# Patient Record
Sex: MALE | Race: WHITE | NOT HISPANIC OR LATINO | URBAN - METROPOLITAN AREA
[De-identification: names, ages, dates, MRNs, and addresses within clinical notes are randomized per-mention and may not be internally consistent; named-entity substitution may affect disease eponyms.]

---

## 2020-02-20 ENCOUNTER — INPATIENT (INPATIENT)
Facility: HOSPITAL | Age: 75
LOS: 4 days | Discharge: HOME HEALTH | End: 2020-02-25
Attending: INTERNAL MEDICINE | Admitting: INTERNAL MEDICINE
Payer: MEDICARE

## 2020-02-20 VITALS
HEART RATE: 129 BPM | WEIGHT: 145.06 LBS | HEIGHT: 66 IN | SYSTOLIC BLOOD PRESSURE: 207 MMHG | OXYGEN SATURATION: 97 % | TEMPERATURE: 98 F | RESPIRATION RATE: 18 BRPM | DIASTOLIC BLOOD PRESSURE: 119 MMHG

## 2020-02-20 DIAGNOSIS — N20.0 CALCULUS OF KIDNEY: ICD-10-CM

## 2020-02-20 DIAGNOSIS — R33.9 RETENTION OF URINE, UNSPECIFIED: ICD-10-CM

## 2020-02-20 DIAGNOSIS — N43.3 HYDROCELE, UNSPECIFIED: Chronic | ICD-10-CM

## 2020-02-20 DIAGNOSIS — I10 ESSENTIAL (PRIMARY) HYPERTENSION: ICD-10-CM

## 2020-02-20 DIAGNOSIS — K40.90 UNILATERAL INGUINAL HERNIA, WITHOUT OBSTRUCTION OR GANGRENE, NOT SPECIFIED AS RECURRENT: ICD-10-CM

## 2020-02-20 DIAGNOSIS — N17.9 ACUTE KIDNEY FAILURE, UNSPECIFIED: ICD-10-CM

## 2020-02-20 DIAGNOSIS — G62.9 POLYNEUROPATHY, UNSPECIFIED: ICD-10-CM

## 2020-02-20 LAB
ALBUMIN SERPL ELPH-MCNC: 4 G/DL — SIGNIFICANT CHANGE UP (ref 3.5–5.2)
ALP SERPL-CCNC: 97 U/L — SIGNIFICANT CHANGE UP (ref 30–115)
ALT FLD-CCNC: 22 U/L — SIGNIFICANT CHANGE UP (ref 0–41)
ANION GAP SERPL CALC-SCNC: 17 MMOL/L — HIGH (ref 7–14)
APPEARANCE UR: CLEAR — SIGNIFICANT CHANGE UP
AST SERPL-CCNC: 42 U/L — HIGH (ref 0–41)
BACTERIA # UR AUTO: ABNORMAL
BASE EXCESS BLDV CALC-SCNC: -3.1 MMOL/L — LOW (ref -2–2)
BASOPHILS # BLD AUTO: 0.03 K/UL — SIGNIFICANT CHANGE UP (ref 0–0.2)
BASOPHILS NFR BLD AUTO: 0.3 % — SIGNIFICANT CHANGE UP (ref 0–1)
BILIRUB SERPL-MCNC: 1.2 MG/DL — SIGNIFICANT CHANGE UP (ref 0.2–1.2)
BILIRUB UR-MCNC: NEGATIVE — SIGNIFICANT CHANGE UP
BUN SERPL-MCNC: 64 MG/DL — CRITICAL HIGH (ref 10–20)
CA-I SERPL-SCNC: 1.09 MMOL/L — LOW (ref 1.12–1.3)
CALCIUM SERPL-MCNC: 8.1 MG/DL — LOW (ref 8.5–10.1)
CHLORIDE SERPL-SCNC: 103 MMOL/L — SIGNIFICANT CHANGE UP (ref 98–110)
CO2 SERPL-SCNC: 17 MMOL/L — SIGNIFICANT CHANGE UP (ref 17–32)
COLOR SPEC: YELLOW — SIGNIFICANT CHANGE UP
CREAT SERPL-MCNC: 4.9 MG/DL — CRITICAL HIGH (ref 0.7–1.5)
DIFF PNL FLD: ABNORMAL
EOSINOPHIL # BLD AUTO: 0.01 K/UL — SIGNIFICANT CHANGE UP (ref 0–0.7)
EOSINOPHIL NFR BLD AUTO: 0.1 % — SIGNIFICANT CHANGE UP (ref 0–8)
EPI CELLS # UR: ABNORMAL /HPF
GAS PNL BLDV: 139 MMOL/L — SIGNIFICANT CHANGE UP (ref 136–145)
GAS PNL BLDV: SIGNIFICANT CHANGE UP
GLUCOSE BLDC GLUCOMTR-MCNC: 185 MG/DL — HIGH (ref 70–99)
GLUCOSE BLDC GLUCOMTR-MCNC: 190 MG/DL — HIGH (ref 70–99)
GLUCOSE SERPL-MCNC: 244 MG/DL — HIGH (ref 70–99)
GLUCOSE UR QL: NEGATIVE MG/DL — SIGNIFICANT CHANGE UP
HCO3 BLDV-SCNC: 22 MMOL/L — SIGNIFICANT CHANGE UP (ref 22–29)
HCT VFR BLD CALC: 40.5 % — LOW (ref 42–52)
HCT VFR BLDA CALC: 45.5 % — HIGH (ref 34–44)
HGB BLD CALC-MCNC: 14.8 G/DL — SIGNIFICANT CHANGE UP (ref 14–18)
HGB BLD-MCNC: 14.1 G/DL — SIGNIFICANT CHANGE UP (ref 14–18)
HOROWITZ INDEX BLDV+IHG-RTO: 21 — SIGNIFICANT CHANGE UP
IMM GRANULOCYTES NFR BLD AUTO: 0.3 % — SIGNIFICANT CHANGE UP (ref 0.1–0.3)
KETONES UR-MCNC: NEGATIVE — SIGNIFICANT CHANGE UP
LACTATE BLDV-MCNC: 0.9 MMOL/L — SIGNIFICANT CHANGE UP (ref 0.5–1.6)
LACTATE SERPL-SCNC: 1.2 MMOL/L — SIGNIFICANT CHANGE UP (ref 0.7–2)
LEUKOCYTE ESTERASE UR-ACNC: NEGATIVE — SIGNIFICANT CHANGE UP
LYMPHOCYTES # BLD AUTO: 0.73 K/UL — LOW (ref 1.2–3.4)
LYMPHOCYTES # BLD AUTO: 6.3 % — LOW (ref 20.5–51.1)
MCHC RBC-ENTMCNC: 30.4 PG — SIGNIFICANT CHANGE UP (ref 27–31)
MCHC RBC-ENTMCNC: 34.8 G/DL — SIGNIFICANT CHANGE UP (ref 32–37)
MCV RBC AUTO: 87.3 FL — SIGNIFICANT CHANGE UP (ref 80–94)
MONOCYTES # BLD AUTO: 1.2 K/UL — HIGH (ref 0.1–0.6)
MONOCYTES NFR BLD AUTO: 10.3 % — HIGH (ref 1.7–9.3)
NEUTROPHILS # BLD AUTO: 9.64 K/UL — HIGH (ref 1.4–6.5)
NEUTROPHILS NFR BLD AUTO: 82.7 % — HIGH (ref 42.2–75.2)
NITRITE UR-MCNC: NEGATIVE — SIGNIFICANT CHANGE UP
NRBC # BLD: 0 /100 WBCS — SIGNIFICANT CHANGE UP (ref 0–0)
PCO2 BLDV: 38 MMHG — LOW (ref 41–51)
PH BLDV: 7.37 — SIGNIFICANT CHANGE UP (ref 7.26–7.43)
PH UR: 5.5 — SIGNIFICANT CHANGE UP (ref 5–8)
PLATELET # BLD AUTO: 120 K/UL — LOW (ref 130–400)
PO2 BLDV: 35 MMHG — SIGNIFICANT CHANGE UP (ref 20–40)
POTASSIUM BLDV-SCNC: 4.3 MMOL/L — SIGNIFICANT CHANGE UP (ref 3.3–5.6)
POTASSIUM SERPL-MCNC: 4.6 MMOL/L — SIGNIFICANT CHANGE UP (ref 3.5–5)
POTASSIUM SERPL-SCNC: 4.6 MMOL/L — SIGNIFICANT CHANGE UP (ref 3.5–5)
PROT SERPL-MCNC: 6.5 G/DL — SIGNIFICANT CHANGE UP (ref 6–8)
PROT UR-MCNC: NEGATIVE MG/DL — SIGNIFICANT CHANGE UP
RBC # BLD: 4.64 M/UL — LOW (ref 4.7–6.1)
RBC # FLD: 12.1 % — SIGNIFICANT CHANGE UP (ref 11.5–14.5)
SAO2 % BLDV: 67 % — SIGNIFICANT CHANGE UP
SODIUM SERPL-SCNC: 137 MMOL/L — SIGNIFICANT CHANGE UP (ref 135–146)
SP GR SPEC: 1.01 — SIGNIFICANT CHANGE UP (ref 1.01–1.03)
UROBILINOGEN FLD QL: 0.2 MG/DL — SIGNIFICANT CHANGE UP (ref 0.2–0.2)
WBC # BLD: 11.65 K/UL — HIGH (ref 4.8–10.8)
WBC # FLD AUTO: 11.65 K/UL — HIGH (ref 4.8–10.8)
WBC UR QL: SIGNIFICANT CHANGE UP /HPF

## 2020-02-20 PROCEDURE — 74176 CT ABD & PELVIS W/O CONTRAST: CPT | Mod: 26

## 2020-02-20 PROCEDURE — 71046 X-RAY EXAM CHEST 2 VIEWS: CPT | Mod: 26

## 2020-02-20 PROCEDURE — 99285 EMERGENCY DEPT VISIT HI MDM: CPT | Mod: 25,GC

## 2020-02-20 PROCEDURE — 76770 US EXAM ABDO BACK WALL COMP: CPT | Mod: 26

## 2020-02-20 PROCEDURE — 99223 1ST HOSP IP/OBS HIGH 75: CPT | Mod: AI

## 2020-02-20 PROCEDURE — 51703 INSERT BLADDER CATH COMPLEX: CPT | Mod: GC

## 2020-02-20 PROCEDURE — 99497 ADVNCD CARE PLAN 30 MIN: CPT | Mod: 25

## 2020-02-20 RX ORDER — DEXTROSE 50 % IN WATER 50 %
15 SYRINGE (ML) INTRAVENOUS ONCE
Refills: 0 | Status: DISCONTINUED | OUTPATIENT
Start: 2020-02-20 | End: 2020-02-25

## 2020-02-20 RX ORDER — HYDRALAZINE HCL 50 MG
25 TABLET ORAL EVERY 8 HOURS
Refills: 0 | Status: DISCONTINUED | OUTPATIENT
Start: 2020-02-20 | End: 2020-02-25

## 2020-02-20 RX ORDER — IOHEXOL 300 MG/ML
30 INJECTION, SOLUTION INTRAVENOUS ONCE
Refills: 0 | Status: COMPLETED | OUTPATIENT
Start: 2020-02-20 | End: 2020-02-20

## 2020-02-20 RX ORDER — CHLORHEXIDINE GLUCONATE 213 G/1000ML
1 SOLUTION TOPICAL
Refills: 0 | Status: DISCONTINUED | OUTPATIENT
Start: 2020-02-20 | End: 2020-02-25

## 2020-02-20 RX ORDER — INSULIN LISPRO 100/ML
VIAL (ML) SUBCUTANEOUS
Refills: 0 | Status: DISCONTINUED | OUTPATIENT
Start: 2020-02-20 | End: 2020-02-25

## 2020-02-20 RX ORDER — INFLUENZA VIRUS VACCINE 15; 15; 15; 15 UG/.5ML; UG/.5ML; UG/.5ML; UG/.5ML
0.5 SUSPENSION INTRAMUSCULAR ONCE
Refills: 0 | Status: COMPLETED | OUTPATIENT
Start: 2020-02-20 | End: 2020-02-25

## 2020-02-20 RX ORDER — SODIUM CHLORIDE 9 MG/ML
1000 INJECTION, SOLUTION INTRAVENOUS
Refills: 0 | Status: DISCONTINUED | OUTPATIENT
Start: 2020-02-20 | End: 2020-02-25

## 2020-02-20 RX ORDER — SODIUM CHLORIDE 9 MG/ML
1000 INJECTION INTRAMUSCULAR; INTRAVENOUS; SUBCUTANEOUS
Refills: 0 | Status: DISCONTINUED | OUTPATIENT
Start: 2020-02-20 | End: 2020-02-20

## 2020-02-20 RX ORDER — DEXTROSE 50 % IN WATER 50 %
12.5 SYRINGE (ML) INTRAVENOUS ONCE
Refills: 0 | Status: DISCONTINUED | OUTPATIENT
Start: 2020-02-20 | End: 2020-02-25

## 2020-02-20 RX ORDER — DEXTROSE 50 % IN WATER 50 %
25 SYRINGE (ML) INTRAVENOUS ONCE
Refills: 0 | Status: DISCONTINUED | OUTPATIENT
Start: 2020-02-20 | End: 2020-02-25

## 2020-02-20 RX ORDER — GLUCAGON INJECTION, SOLUTION 0.5 MG/.1ML
1 INJECTION, SOLUTION SUBCUTANEOUS ONCE
Refills: 0 | Status: DISCONTINUED | OUTPATIENT
Start: 2020-02-20 | End: 2020-02-25

## 2020-02-20 RX ORDER — SODIUM CHLORIDE 9 MG/ML
2000 INJECTION, SOLUTION INTRAVENOUS ONCE
Refills: 0 | Status: COMPLETED | OUTPATIENT
Start: 2020-02-20 | End: 2020-02-20

## 2020-02-20 RX ORDER — SODIUM CHLORIDE 9 MG/ML
1000 INJECTION INTRAMUSCULAR; INTRAVENOUS; SUBCUTANEOUS
Refills: 0 | Status: DISCONTINUED | OUTPATIENT
Start: 2020-02-20 | End: 2020-02-25

## 2020-02-20 RX ORDER — LABETALOL HCL 100 MG
100 TABLET ORAL ONCE
Refills: 0 | Status: COMPLETED | OUTPATIENT
Start: 2020-02-20 | End: 2020-02-20

## 2020-02-20 RX ORDER — HEPARIN SODIUM 5000 [USP'U]/ML
5000 INJECTION INTRAVENOUS; SUBCUTANEOUS EVERY 8 HOURS
Refills: 0 | Status: DISCONTINUED | OUTPATIENT
Start: 2020-02-20 | End: 2020-02-25

## 2020-02-20 RX ORDER — ACETAMINOPHEN 500 MG
650 TABLET ORAL EVERY 6 HOURS
Refills: 0 | Status: DISCONTINUED | OUTPATIENT
Start: 2020-02-20 | End: 2020-02-25

## 2020-02-20 RX ORDER — TAMSULOSIN HYDROCHLORIDE 0.4 MG/1
0.4 CAPSULE ORAL AT BEDTIME
Refills: 0 | Status: DISCONTINUED | OUTPATIENT
Start: 2020-02-20 | End: 2020-02-25

## 2020-02-20 RX ADMIN — SODIUM CHLORIDE 75 MILLILITER(S): 9 INJECTION INTRAMUSCULAR; INTRAVENOUS; SUBCUTANEOUS at 16:32

## 2020-02-20 RX ADMIN — SODIUM CHLORIDE 2000 MILLILITER(S): 9 INJECTION, SOLUTION INTRAVENOUS at 08:30

## 2020-02-20 RX ADMIN — Medication 1: at 16:55

## 2020-02-20 RX ADMIN — TAMSULOSIN HYDROCHLORIDE 0.4 MILLIGRAM(S): 0.4 CAPSULE ORAL at 18:26

## 2020-02-20 RX ADMIN — Medication 100 MILLIGRAM(S): at 22:26

## 2020-02-20 RX ADMIN — HEPARIN SODIUM 5000 UNIT(S): 5000 INJECTION INTRAVENOUS; SUBCUTANEOUS at 21:09

## 2020-02-20 RX ADMIN — Medication 25 MILLIGRAM(S): at 15:29

## 2020-02-20 RX ADMIN — HEPARIN SODIUM 5000 UNIT(S): 5000 INJECTION INTRAVENOUS; SUBCUTANEOUS at 18:27

## 2020-02-20 RX ADMIN — IOHEXOL 30 MILLILITER(S): 300 INJECTION, SOLUTION INTRAVENOUS at 08:28

## 2020-02-20 NOTE — GOALS OF CARE CONVERSATION - ADVANCED CARE PLANNING - CONVERSATION DETAILS
patient's overall medical condition is discussed with him in the presence of sister.  He does not like to follow up with physicians, admitted for DAISY, Urinary retention/obstructive uropathy, elevated BP.     He wishes to be DNR/DNI; signed MOLST form

## 2020-02-20 NOTE — H&P ADULT - NSICDXPASTMEDICALHX_GEN_ALL_CORE_FT
PAST MEDICAL HISTORY:  Common ichthyosis     Hydrocele     Inguinal hernia     Peripheral neuropathy

## 2020-02-20 NOTE — ED PROVIDER NOTE - NS ED ROS FT
GEN:  no fever, no chills  NEURO:  no headache, no dizziness  ENT: no sore throat, no runny nose  CV:  no chest pain, no palpitations  RESP:  no sob, no cough  GI:  no nausea, no vomiting, no abdominal pain, no diarrhea  :  no dysuria, + urinary frequency, no hematuria  MSK:  no joint pain, no edema  SKIN:  + chronic rash, no bruising  HEME: no hematochezia, no melena

## 2020-02-20 NOTE — ED PROVIDER NOTE - PMH
Hydrocele Common ichthyosis    Hydrocele    Inguinal hernia Common ichthyosis    Hydrocele    Inguinal hernia    Peripheral neuropathy

## 2020-02-20 NOTE — ED PROVIDER NOTE - ATTENDING CONTRIBUTION TO CARE
74y male c/o progressive inability to empty bladder, has not seen a doctor in at least 10 years, has had elevated BP for which he has not been treated, as well as an inguinal hernia at least 25 years old for which he has not sought care, no change in size, +BMs and flatus with pain secondary to hemorrhoids, +lower abd discomfort, no back pain, no f/c, no cp/sob, no h/a, denies etoh or drugs, on exam vital signs appreciated, nontoxic but uncomfortable, head nc/at, perrla 2mm, conj pink neck supple cor tachy, reg, lungs cta, abd +bs, soft, distended, suprapubic dullness, has larg inguinal hernia without overlying erythema, nonreducible, difficult to appreciated right testis due to hernia but seems nontender, left testis nontender, has nonbleeding external hemorrhoid, neuro nonfocal, will place yoo, repeat VS, check labs and CT

## 2020-02-20 NOTE — ED PROVIDER NOTE - PROGRESS NOTE DETAILS
TC TC: 75 yo M with PMHx of ichthyosis, L hydrocele s/p hydrocelectomy, R inguinal hernia, peripheral neuropathy who presents with urinary retention x 1 wk. Has chronic large R inguinal hernia which is soft. No systemic sx. No CVA tenderness. Ordered labs, ekg, CT abd, ua, urine cx. Will place yoo for urinary retention. TC: RN unable to pass yoo. 16fr cudet passed without difficulty. Immediate return of > 500cc urine obtained, sent to lab. TC: Labs notable for WBC 11, BUN/Cr *** /5.1. K4.6. Changed CT abd to po contrast only. CT tech Judith aware. Started on IVF. TC: Labs notable for WBC 11, BUN/Cr 64/4.9. K 4.6. Changed CT abd to po contrast only. CT tech Judith aware. Started on IVF. TC: Labs notable for WBC 11, BUN/Cr 64/4.9. K 4.6. Changed CT abd to po contrast only. CT tech Judith aware. Started on IVF. Pt and wife updated at bedside. UA with small blood, no LE or nitrites. TC: No acidosis on vbg. Placed nephro c/s. Pt ambulated to cxr. TC: No acidosis on vbg. Placed nephro c/s. Reassessed pt, no complaints at this time. TC: Reassessed pt, sitting comfortably, still without complaints. Julien now with pink tinged urine output. Pending CT read. TC: CT abd shows [id number] L distal ureter obstructing stone with mild hydro. Also with severe bilateral hydro more distally and enlarged prostate. Pt states he intermittently has lower back pain but no CVA tenderness on exam. Spoke with uro SESAR Thomas who will come see pt. 2nd call placed for nephro. Will admit. TC: Spoke with nephro who recommended maintenance IVF, flomax. TC: Spoke with nephro who recommended maintenance IVF, flomax, and will see pt tomorrow.

## 2020-02-20 NOTE — CONSULT NOTE ADULT - SUBJECTIVE AND OBJECTIVE BOX
74 year old male with PMH of HTN, peripheral neuropathy, inguinal hernia, ichthyosis, and hydrocele s/p L hydrocelectomy presents c/o difficulty urinating assocated with urinary hesitancy and urinary frequency for 1 week. Pt reports he noticed decreased in urinary flow for the past week and constant dribbling. Pt states he hasn't seen a urologist for many years. Pt denies seeing a medical doctor for many years either. Pt denies hematuria, pain, fever, chills, SOB, CP, HE, N/V/D/C.  Pt reports hemorrhoids accompanied by pain while sitting. Pt reports inguinal hernia for 25 years.  In ED patient had Julien inserted.           PAST MEDICAL & SURGICAL HISTORY:  HTN  Peripheral neuropathy  Inguinal hernia  Common ichthyosis  Hydrocele  Hydrocele        HOME MEDICATIONS:  OTC- Vit C, Mg, Calcium         Allergies    No Known Allergies    Intolerances           SOCIAL HISTORY:  Tobacco use: denies  Alcohol use:  denies  Denies illicit drug use      FAMILY HISTORY:  Father: throat Cancer and BPH      REVIEW OF SYSTEMS:    CONSTITUTIONAL: No weakness, fevers or chills  EYES/ENT: No visual changes;  No vertigo or throat pain   NECK: No pain or stiffness  RESPIRATORY: No cough, wheezing, hemoptysis; No shortness of breath  CARDIOVASCULAR: No chest pain or palpitations  GASTROINTESTINAL: No abdominal or epigastric pain. No nausea, vomiting, or hematemesis; No diarrhea or constipation. No melena or hematochezia.  GENITOURINARY: See HPI  NEUROLOGICAL: + numbness  SKIN: No itching, burning, rashes, or lesions   All other review of systems is negative unless indicated above.      Vital Signs Last 24 Hrs  T(C): 36.2 (2020 08:47), Max: 37 (2020 07:09)  T(F): 97.1 (2020 08:47), Max: 98.6 (2020 07:09)  HR: 98 (2020 10:35) (98 - 129)  BP: 197/101 (2020 10:35) (197/101 - 221/109)  RR: 18 (2020 10:35) (18 - 18)  SpO2: 99% (2020 10:35) (97% - 99%)    PHYSICAL EXAM:  General:  conversant in NAD, currently resting comfortably.  HEENT:  NC/AT  Abdominal:  + BS, soft, NT,ND  Genitourinary:  + *** flank tenderness.  No suprapubic tenderness.    Extremities:  No LE edema   Neuro:  Awake, alert & oriented         I&O's Summary    2020 07:01  -  2020 12:50  --------------------------------------------------------  IN: 0 mL / OUT: 3900 mL / NET: -3900 mL          LABS:                        14.1   11.65 )-----------( 120      ( 2020 07:20 )             40.5         137  |  103  |  64<HH>  ----------------------------<  244<H>  4.6   |  17  |  4.9<HH>    Ca    8.1<L>      2020 07:20    TPro  6.5  /  Alb  4.0  /  TBili  1.2  /  DBili  x   /  AST  42<H>  /  ALT  22  /  AlkPhos  97        Urinalysis Basic - ( 2020 07:20 )    Color: Yellow / Appearance: Clear / S.010 / pH: x  Gluc: x / Ketone: Negative  / Bili: Negative / Urobili: 0.2 mg/dL   Blood: x / Protein: Negative mg/dL / Nitrite: Negative   Leuk Esterase: Negative / RBC: x / WBC 3-5 /HPF   Sq Epi: x / Non Sq Epi: Occasional /HPF / Bacteria: Few      Urine Culture:           RADIOLOGY & ADDITIONAL STUDIES:    CT Abdomen and Pelvis w/ Oral Cont (20 @ 11:09)   EXAM:  CT ABDOMEN AND PELVIS OC            PROCEDURE DATE:  2020            INTERPRETATION:  CLINICAL STATEMENT: Urinary retention, right inguinal hernia      TECHNIQUE: Contiguous axial CT images were obtained from the lower chest to the pubic symphysis without intravenous contrast.  Oral contrast was administered.  Reformatted images in the coronal and sagittal planes were acquired.    COMPARISON CT: None.      FINDINGS:    LOWER CHEST: Bilateral subsegmental dependent atelectasis.    HEPATOBILIARY: Unremarkable.    SPLEEN: Unremarkable.    PANCREAS: Unremarkable.    ADRENAL GLANDS: Unremarkable.    KIDNEYS: Bilateral mild hydroureteronephrosis. On the right this is to the level the bladder; a definitive obstructing cause on the right is not seen.  Bilateral perinephric fat stranding. There is an 8 x 7 x 12 mm left distal ureteral calculus (1394 Hounsfield units) producing the mild left hydroureteronephrosis. Just distal to this obstructing calculus, a small portion of the distal left ureter as it enters the bladder demonstrates severe hydroureter. Although the bladder wall is thickened, a definitive second obstructing process is not seen.    ABDOMINOPELVIC NODES: Unremarkable.    PELVIC ORGANS: Nondistended urinary bladder containing a intraluminal urinary catheter balloon, with trace urine and air. Bladder wall thickening, possibly related to underdistention. Perivesicular fat stranding. The prostate gland is enlarged indenting the base of the bladder.    PERITONEUM/MESENTERY/BOWEL: No bowel obstructive, intra-abdominal free air or ascites. Normal caliber appendix..    BONES/SOFT TISSUES: Degenerative changes of the spine. Nonspecific sclerosis of the left iliac wing at the SI joint. Large right fat containing inguinal hernia.    OTHER: Vascular calcifications      IMPRESSION:    Bilateral mild hydroureteronephrosis. A definitive obstructing cause on the right is not seen. At the distal right ureter, there is severe hydroureter.    Left distal ureteral obstructing calculus measuring 8 x 7 x 12 mm producing the mild left hydroureteronephrosis.     However, just distal to this obstructing left calculus, a small portion of the distal left ureter as it enters the bladder demonstrates severe hydroureter. Although the bladder wall is thickened, a definitive second obstructing process is not seen.    Perivesicular fat stranding and bladder wall thickening. Correlation with urinalysis is recommended to evaluate for cystitis.    Enlarged prostate, possibly producing bladder outlet obstruction.    Further evaluation of the above findings with a CT urogram on outpatient basis is recommended to evaluate for the cause of the right and distal left second obstruction              MIAH FELDMAN M.D., RESIDENT RADIOLOGIST  This document has been electronically signed.  FAVIOLA VALENCIA M.D., ATTENDING RADIOLOGIST  This document has been electronically signed. 2020 12:08PM 74 year old male with PMH of HTN, peripheral neuropathy, inguinal hernia, ichthyosis, and hydrocele s/p L hydrocelectomy presents c/o difficulty urinating assocated with urinary hesitancy and urinary frequency for 1 week. Pt reports he noticed decreased in urinary flow for the past week and constant dribbling. Pt states he hasn't seen a urologist for many years.  Pt admits to family Hx of BPH. Pt denies seeing a medical doctor for many years either. Pt denies pain, fever, chills, SOB, CP, HA, N/V/D/C.  Pt reports hemorrhoids accompanied by pain while sitting. Pt reports inguinal hernia for 25 years that's recently started increasing in size.  In ED patient had Julien inserted with initial 500ml  and total 2900 ml output.           PAST MEDICAL & SURGICAL HISTORY:  HTN  Peripheral neuropathy  Inguinal hernia  Common ichthyosis  Hydrocele          HOME MEDICATIONS:  OTC- Vit C, Mg, Calcium         Allergies    No Known Allergies    Intolerances           SOCIAL HISTORY:  Tobacco use: denies  Alcohol use:  denies  Denies illicit drug use      FAMILY HISTORY:  Father: throat Cancer and BPH      REVIEW OF SYSTEMS:    CONSTITUTIONAL: No weakness, fevers or chills  EYES/ENT: No visual changes;  No vertigo or throat pain   NECK: No pain or stiffness  RESPIRATORY: No cough, wheezing, hemoptysis; No shortness of breath  CARDIOVASCULAR: No chest pain or palpitations  GASTROINTESTINA: + rt inguinal hernia, No nausea, vomiting, or hematemesis; No diarrhea or constipation. No melena or hematochezia.  GENITOURINARY: See HPI  NEUROLOGICAL: + numbness  SKIN: +dry skin and scales  All other review of systems is negative unless indicated above.      Vital Signs Last 24 Hrs  T(C): 36.2 (2020 08:47), Max: 37 (2020 07:09)  T(F): 97.1 (2020 08:47), Max: 98.6 (2020 07:09)  HR: 98 (2020 10:35) (98 - 129)  BP: 197/101 (2020 10:35) (197/101 - 221/109)  RR: 18 (2020 10:35) (18 - 18)  SpO2: 99% (2020 10:35) (97% - 99%)    PHYSICAL EXAM:  General:  conversant in NAD, currently resting comfortably.  Abdominal:  right inguinal hernia n nonreducible but soft, non ischemic scrotal edema noticed, circumcised penis. Julien in place with blood tinged urine in tubing with some debris.  Genitourinary:  No CVA tenderness b/l.  No suprapubic tenderness.    Extremities:  No LE edema   Neuro:  Awake, alert & oriented, speech clear  Skin: diffuse dry scaling skin         I&O's Summary    2020 07:01  -  2020 12:50  --------------------------------------------------------  IN: 0 mL / OUT: 3900 mL / NET: -3900 mL          LABS:                        14.1   11.65 )-----------( 120      ( 2020 07:20 )             40.5     02-20    137  |  103  |  64<HH>  ----------------------------<  244<H>  4.6   |  17  |  4.9<HH>    Ca    8.1<L>      2020 07:20    TPro  6.5  /  Alb  4.0  /  TBili  1.2  /  DBili  x   /  AST  42<H>  /  ALT  22  /  AlkPhos  97  02-20      Urinalysis Basic - ( 2020 07:20 )    Color: Yellow / Appearance: Clear / S.010 / pH: x  Gluc: x / Ketone: Negative  / Bili: Negative / Urobili: 0.2 mg/dL   Blood: x / Protein: Negative mg/dL / Nitrite: Negative   Leuk Esterase: Negative / RBC: x / WBC 3-5 /HPF   Sq Epi: x / Non Sq Epi: Occasional /HPF / Bacteria: Few      Urine Culture:           RADIOLOGY & ADDITIONAL STUDIES:    CT Abdomen and Pelvis w/ Oral Cont (20 @ 11:09)   EXAM:  CT ABDOMEN AND PELVIS OC            PROCEDURE DATE:  2020            INTERPRETATION:  CLINICAL STATEMENT: Urinary retention, right inguinal hernia      TECHNIQUE: Contiguous axial CT images were obtained from the lower chest to the pubic symphysis without intravenous contrast.  Oral contrast was administered.  Reformatted images in the coronal and sagittal planes were acquired.    COMPARISON CT: None.      FINDINGS:    LOWER CHEST: Bilateral subsegmental dependent atelectasis.    HEPATOBILIARY: Unremarkable.    SPLEEN: Unremarkable.    PANCREAS: Unremarkable.    ADRENAL GLANDS: Unremarkable.    KIDNEYS: Bilateral mild hydroureteronephrosis. On the right this is to the level the bladder; a definitive obstructing cause on the right is not seen.  Bilateral perinephric fat stranding. There is an 8 x 7 x 12 mm left distal ureteral calculus (1394 Hounsfield units) producing the mild left hydroureteronephrosis. Just distal to this obstructing calculus, a small portion of the distal left ureter as it enters the bladder demonstrates severe hydroureter. Although the bladder wall is thickened, a definitive second obstructing process is not seen.    ABDOMINOPELVIC NODES: Unremarkable.    PELVIC ORGANS: Nondistended urinary bladder containing a intraluminal urinary catheter balloon, with trace urine and air. Bladder wall thickening, possibly related to underdistention. Perivesicular fat stranding. The prostate gland is enlarged indenting the base of the bladder.    PERITONEUM/MESENTERY/BOWEL: No bowel obstructive, intra-abdominal free air or ascites. Normal caliber appendix..    BONES/SOFT TISSUES: Degenerative changes of the spine. Nonspecific sclerosis of the left iliac wing at the SI joint. Large right fat containing inguinal hernia.    OTHER: Vascular calcifications      IMPRESSION:    Bilateral mild hydroureteronephrosis. A definitive obstructing cause on the right is not seen. At the distal right ureter, there is severe hydroureter.    Left distal ureteral obstructing calculus measuring 8 x 7 x 12 mm producing the mild left hydroureteronephrosis.     However, just distal to this obstructing left calculus, a small portion of the distal left ureter as it enters the bladder demonstrates severe hydroureter. Although the bladder wall is thickened, a definitive second obstructing process is not seen.    Perivesicular fat stranding and bladder wall thickening. Correlation with urinalysis is recommended to evaluate for cystitis.    Enlarged prostate, possibly producing bladder outlet obstruction.    Further evaluation of the above findings with a CT urogram on outpatient basis is recommended to evaluate for the cause of the right and distal left second obstruction              MIAH FELDMAN M.D., RESIDENT RADIOLOGIST  This document has been electronically signed.  FAVIOLA VALENCIA M.D., ATTENDING RADIOLOGIST  This document has been electronically signed. 2020 12:08PM

## 2020-02-20 NOTE — ED PROVIDER NOTE - PHYSICAL EXAMINATION
CONSTITUTIONAL: well developed, nontoxic appearing, in no acute distress, speaking in full sentences  SKIN: warm, dry, diffuse dry scaling rash, cap refill < 2 seconds  HEENT: normocephalic, atraumatic, no conjunctival erythema, moist mucous membranes, patent airway  NECK: supple, no masses  CV:  tachycardic rate, regular rhythm, 2+ radial pulses bilaterally  RESP: no wheezes, no rales, no rhonchi, normal work of breathing  ABD: soft, nontender, nondistended, no rebound, no guarding  BACK: no CVA tenderness  : scrotal edema with large soft R inguinal hernia, nonreducible but soft/nontender, circumcised penis without lesions or rash, no blood or discharge per urethra, mild bilateral testicular swelling, no testicular pain, normal testicular lie, male chaperone [place] present  RECTAL: small nontender external hemorrhoid, no internal hemorrhoid palpated, brown stool in rectal vault, no gross blood per rectum  MSK: normal ROM, no cyanosis, no edema  NEURO: alert, oriented, grossly unremarkable  PSYCH: cooperative, appropriate

## 2020-02-20 NOTE — H&P ADULT - HISTORY OF PRESENT ILLNESS
A 74 male with pmhx of HTN not on medication currently, ichthyosis, peripheral neuropathy, L hydrocele s/p hydrocelectomy 50 years ago, Right inguinal hernia for 30 years  presents to ed with urinary retention. Pt reports for past 2 days has been having hard time urinating. Pt reports tries to go to the bathroom but can not urinate a lot , has hard time starting to urinate. Pt denies hematuria. Pt reports yesterday started having burning with urination. Pt has not seen a doctor in 10 years. PT denies taking any medications. Pt denies fever, chills, chest pain, shortness of breath,  back pain, nausea, vomiting.

## 2020-02-20 NOTE — ED PROVIDER NOTE - CLINICAL SUMMARY MEDICAL DECISION MAKING FREE TEXT BOX
74y male c/o progressive inability to empty bladder, has not seen a doctor in at least 10 years, has had elevated BP for which he has not been treated, as well as an inguinal hernia at least 25 years old for which he has not sought care, no change in size, +BMs and flatus with pain secondary to hemorrhoids, +lower abd discomfort, no back pain, no f/c, no cp/sob, no h/a, denies etoh or drugs, on exam vital signs appreciated, nontoxic but uncomfortable, head nc/at, perrla 2mm, conj pink neck supple cor tachy, reg, lungs cta, abd +bs, soft, distended, suprapubic dullness, has large inguinal hernia without overlying erythema, nonreducible, difficult to appreciated right testis due to hernia but seems nontender, left testis nontender, has nonbleeding external hemorrhoid, neuro nonfocal, yoo placed with return 1300ml clear urine, subsequently blood tinged, labs and studies reviewed, will admit, renal paged, urology evaluatin patient, stable for floor

## 2020-02-20 NOTE — H&P ADULT - NSHPPHYSICALEXAM_GEN_ALL_CORE
VITALS:   ICU Vital Signs Last 24 Hrs  T(C): 36.2 (20 Feb 2020 13:02), Max: 37 (20 Feb 2020 07:09)  T(F): 97.2 (20 Feb 2020 13:02), Max: 98.6 (20 Feb 2020 07:09)  HR: 96 (20 Feb 2020 13:02) (96 - 129)  BP: 188/98 (20 Feb 2020 13:02) (188/98 - 221/109)  RR: 18 (20 Feb 2020 10:35) (18 - 18)  SpO2: 99% (20 Feb 2020 10:35) (97% - 99%)        GENERAL: NAD, lying in bed comfortably  HEAD:  Atraumatic, Normocephalic  EYES: EOMI, PERRLA, conjunctiva and sclera clear  ENT: Moist mucous membranes  CHEST/LUNG: Clear to auscultation bilaterally; No rales, rhonchi, wheezing, or rubs.   HEART: Regular rate and rhythm; No murmurs, rubs, or gallops  ABDOMEN: Bowel sounds present; Soft, Nontender, Nondistended. No hepatomegally, Right inguinal hernia soft , no tender non reducible for 30 years.   :  yoo in place , 500 cc of urine with mild hematuria in bag   EXTREMITIES:  mild edema no tenderness   NERVOUS SYSTEM:  Alert & Oriented X3, speech clear. No deficits   MSK: FROM all 4 extremities, full and equal strength  SKIN: No rashes or lesions VITALS:   ICU Vital Signs Last 24 Hrs  T(C): 36.2 (20 Feb 2020 13:02), Max: 37 (20 Feb 2020 07:09)  T(F): 97.2 (20 Feb 2020 13:02), Max: 98.6 (20 Feb 2020 07:09)  HR: 96 (20 Feb 2020 13:02) (96 - 129)  BP: 188/98 (20 Feb 2020 13:02) (188/98 - 221/109)  RR: 18 (20 Feb 2020 10:35) (18 - 18)  SpO2: 99% (20 Feb 2020 10:35) (97% - 99%)        GENERAL: NAD, lying in bed comfortably  HEAD:  Atraumatic, Normocephalic  EYES: EOMI, PERRLA, conjunctiva and sclera clear  ENT: Moist mucous membranes  CHEST/LUNG: Clear to auscultation bilaterally; No rales, rhonchi, wheezing, or rubs.   CV/HEART: Regular rate and rhythm; No murmurs, rubs, or gallops  GI/ABDOMEN: Bowel sounds present; Soft, Nontender, Nondistended. No hepatomegally, Right inguinal hernia soft , no tender non reducible for 30 years.   :  yoo in place , 500 cc of urine with mild hematuria in bag   EXTREMITIES:  mild edema no tenderness   NERVOUS SYSTEM:  Alert & Oriented X3, speech clear. No deficits   MSK: FROM all 4 extremities, full and equal strength  SKIN: No rashes or lesions

## 2020-02-20 NOTE — ED PROVIDER NOTE - CARE PLAN
Principal Discharge DX:	Acute renal failure  Secondary Diagnosis:	Urinary retention Principal Discharge DX:	Acute renal failure  Secondary Diagnosis:	Urinary retention  Secondary Diagnosis:	Kidney stone

## 2020-02-20 NOTE — H&P ADULT - ATTENDING COMMENTS
Patient seen and examined independently of PA, agree with the H/P unless otherwise stated     1) DAISY --- post renal/obstructive uropathy/Urinary retention/hydronephrosis   -Julien inserted --- one liter output in ED  - consult   -Nephrology consult; check renal bladder sonogram, Urine protein : Cr ratio    2) Elevated SBP/HTN  -not on meds    3) Non-compliance with medical care/follow ups    4) dvt and gi ppx       # Progress Note Handoff  PENDING as follows  consults: Nephrology,    Test: BMP  Family discussion: discussed with patient who comprehends his medical care and signed MOLST form for DNR/DNI  Disposition: home once clinically stable     Attending Physician Dr. Avril Landry # [id number]

## 2020-02-20 NOTE — H&P ADULT - ASSESSMENT
A 74 male with pmhx of HTN not on medication currently, ichthyosis, peripheral neuropathy, L hydrocele s/p hydrocelectomy 50 years ago, Right inguinal hernia for 30 years  presents to ed with urinary retention and admitted for DAISY, urinary retention and kidney stone .

## 2020-02-20 NOTE — ED PROVIDER NOTE - OBJECTIVE STATEMENT
75 yo M with PMHx of ichthyosis, L hydrocele s/p hydrocelectomy, R inguinal hernia who presents with gradual onset of intermittent difficulty urinating x 1 w associated with urinary hesitancy, decreased urinary stream, urinary frequency/urgency 73 yo M with PMHx of ichthyosis, L hydrocele s/p hydrocelectomy, R inguinal hernia, peripheral neuropathy who presents with gradual onset of intermittent difficulty urinating x 1 wk associated with urinary hesitancy, decreased urinary stream, urinary frequency/urgency. No fever, chills, nausea, vomiting, abd pain, dysuria, hematuria, constipation, hematochezia, melena, weight loss, night sweats. Has R inguinal hernia x 25 yrs which has been growing in size and protruding for past 5 yrs but has not seen PMD in many yrs. No hx of abd surgeries. No prior hx of prostate problems. 75 yo M with PMHx of ichthyosis, L hydrocele s/p hydrocelectomy, R inguinal hernia, peripheral neuropathy who presents with gradual onset of intermittent difficulty urinating x 1 wk associated with urinary hesitancy, decreased urinary stream, urinary frequency/urgency. No fever, chills, nausea, vomiting, abd pain, dysuria, hematuria, constipation, hematochezia, melena, weight loss, night sweats. Has R inguinal hernia x 25 yrs which has been protruding for past 5 yrs but has not seen PMD in many yrs. No hx of abd surgeries or prostate problems. 73 yo M with PMHx of ichthyosis, L hydrocele s/p hydrocelectomy, R inguinal hernia, peripheral neuropathy who presents with gradual onset of intermittent difficulty urinating x 1 wk associated with urinary hesitancy, decreased urinary stream, urinary frequency/urgency. No fever, chills, nausea, vomiting, abd pain, dysuria, hematuria, constipation, hematochezia, melena, weight loss, night sweats. Has R inguinal hernia x 25 yrs which has been protruding for past 5 yrs but has not seen PMD in many yrs. No hx of abd surgeries.

## 2020-02-20 NOTE — H&P ADULT - NSHPLABSRESULTS_GEN_ALL_CORE
14.1    )-----------( 120      ( 2020 07:20 )             40.5           137  |  103  |  64<HH>  ----------------------------<  244<H>  4.6   |  17  |  4.9<HH>    Ca    8.1<L>      2020 07:20    TPro  6.5  /  Alb  4.0  /  TBili  1.2  /  DBili  x   /  AST  42<H>  /  ALT  22  /  AlkPhos  97                    Urinalysis Basic - ( 2020 07:20 )    Color: Yellow / Appearance: Clear / S.010 / pH: x  Gluc: x / Ketone: Negative  / Bili: Negative / Urobili: 0.2 mg/dL   Blood: x / Protein: Negative mg/dL / Nitrite: Negative   Leuk Esterase: Negative / RBC: x / WBC 3-5 /HPF   Sq Epi: x / Non Sq Epi: Occasional /HPF / Bacteria: Few    Lactate Trend   @ 07:20 Lactate:1.2     < from: CT Abdomen and Pelvis w/ Oral Cont (20 @ 11:09) >    IMPRESSION:    Bilateral mild hydroureteronephrosis. A definitive obstructing cause on the right is not seen. At the distal right ureter, there is severe hydroureter.    Left distal ureteral obstructing calculus measuring 8 x 7 x 12 mm producing the mild left hydroureteronephrosis.     However, just distal to this obstructing left calculus, a small portion of the distal left ureter as it enters the bladder demonstrates severe hydroureter. Although the bladder wall is thickened, a definitive second obstructing process is not seen.    Perivesicular fat stranding and bladder wall thickening. Correlation with urinalysis is recommended to evaluate for cystitis.    Enlarged prostate, possibly producing bladder outlet obstruction.    Further evaluation of the above findings with a CT urogram on outpatient basis is recommended to evaluate for the cause of the right and distal left second obstruction        MIAH FELDMAN M.D., RESIDENT RADIOLOGIST    < end of copied text >

## 2020-02-20 NOTE — H&P ADULT - NSHPREVIEWOFSYSTEMS_GEN_ALL_CORE
REVIEW OF SYSTEMS:  CONSTITUTIONAL: No fever, weight loss, or fatigue  EYES: No eye pain, visual disturbances, or discharge  NECK: No pain or stiffness  RESPIRATORY: No cough, wheezing, chills or hemoptysis; No shortness of breath  CARDIOVASCULAR: No chest pain, palpitations, dizziness, or leg swelling  GASTROINTESTINAL: No abdominal or epigastric pain. No nausea, vomiting, or hematemesis; No diarrhea or constipation. No melena or hematochezia.  GENITOURINARY: unable to urinate/retention for 2 days  NEUROLOGICAL: No headaches, memory loss, loss of strength, numbness, or tremors  MUSCULOSKELETAL: No joint pain or swelling; No muscle, back, or extremity pain

## 2020-02-21 DIAGNOSIS — N17.9 ACUTE KIDNEY FAILURE, UNSPECIFIED: ICD-10-CM

## 2020-02-21 LAB
ANION GAP SERPL CALC-SCNC: 12 MMOL/L — SIGNIFICANT CHANGE UP (ref 7–14)
BUN SERPL-MCNC: 37 MG/DL — HIGH (ref 10–20)
CALCIUM SERPL-MCNC: 7.8 MG/DL — LOW (ref 8.5–10.1)
CHLORIDE SERPL-SCNC: 107 MMOL/L — SIGNIFICANT CHANGE UP (ref 98–110)
CO2 SERPL-SCNC: 23 MMOL/L — SIGNIFICANT CHANGE UP (ref 17–32)
CREAT SERPL-MCNC: 1.9 MG/DL — HIGH (ref 0.7–1.5)
CULTURE RESULTS: SIGNIFICANT CHANGE UP
GLUCOSE BLDC GLUCOMTR-MCNC: 157 MG/DL — HIGH (ref 70–99)
GLUCOSE BLDC GLUCOMTR-MCNC: 183 MG/DL — HIGH (ref 70–99)
GLUCOSE BLDC GLUCOMTR-MCNC: 203 MG/DL — HIGH (ref 70–99)
GLUCOSE BLDC GLUCOMTR-MCNC: 231 MG/DL — HIGH (ref 70–99)
GLUCOSE SERPL-MCNC: 182 MG/DL — HIGH (ref 70–99)
HCT VFR BLD CALC: 40.2 % — LOW (ref 42–52)
HCV AB S/CO SERPL IA: 0.19 S/CO — SIGNIFICANT CHANGE UP (ref 0–0.99)
HCV AB SERPL-IMP: SIGNIFICANT CHANGE UP
HGB BLD-MCNC: 13.7 G/DL — LOW (ref 14–18)
MCHC RBC-ENTMCNC: 29.9 PG — SIGNIFICANT CHANGE UP (ref 27–31)
MCHC RBC-ENTMCNC: 34.1 G/DL — SIGNIFICANT CHANGE UP (ref 32–37)
MCV RBC AUTO: 87.8 FL — SIGNIFICANT CHANGE UP (ref 80–94)
NRBC # BLD: 0 /100 WBCS — SIGNIFICANT CHANGE UP (ref 0–0)
PLATELET # BLD AUTO: 129 K/UL — LOW (ref 130–400)
POTASSIUM SERPL-MCNC: 4.3 MMOL/L — SIGNIFICANT CHANGE UP (ref 3.5–5)
POTASSIUM SERPL-SCNC: 4.3 MMOL/L — SIGNIFICANT CHANGE UP (ref 3.5–5)
RBC # BLD: 4.58 M/UL — LOW (ref 4.7–6.1)
RBC # FLD: 12.1 % — SIGNIFICANT CHANGE UP (ref 11.5–14.5)
SODIUM SERPL-SCNC: 142 MMOL/L — SIGNIFICANT CHANGE UP (ref 135–146)
SPECIMEN SOURCE: SIGNIFICANT CHANGE UP
WBC # BLD: 10.26 K/UL — SIGNIFICANT CHANGE UP (ref 4.8–10.8)
WBC # FLD AUTO: 10.26 K/UL — SIGNIFICANT CHANGE UP (ref 4.8–10.8)

## 2020-02-21 PROCEDURE — 99233 SBSQ HOSP IP/OBS HIGH 50: CPT

## 2020-02-21 RX ADMIN — SODIUM CHLORIDE 75 MILLILITER(S): 9 INJECTION INTRAMUSCULAR; INTRAVENOUS; SUBCUTANEOUS at 17:51

## 2020-02-21 RX ADMIN — HEPARIN SODIUM 5000 UNIT(S): 5000 INJECTION INTRAVENOUS; SUBCUTANEOUS at 14:35

## 2020-02-21 RX ADMIN — Medication 25 MILLIGRAM(S): at 21:15

## 2020-02-21 RX ADMIN — HEPARIN SODIUM 5000 UNIT(S): 5000 INJECTION INTRAVENOUS; SUBCUTANEOUS at 05:32

## 2020-02-21 RX ADMIN — Medication 1: at 07:46

## 2020-02-21 RX ADMIN — HEPARIN SODIUM 5000 UNIT(S): 5000 INJECTION INTRAVENOUS; SUBCUTANEOUS at 21:15

## 2020-02-21 RX ADMIN — TAMSULOSIN HYDROCHLORIDE 0.4 MILLIGRAM(S): 0.4 CAPSULE ORAL at 21:15

## 2020-02-21 RX ADMIN — Medication 650 MILLIGRAM(S): at 21:15

## 2020-02-21 RX ADMIN — Medication 2: at 11:19

## 2020-02-21 RX ADMIN — Medication 2: at 16:39

## 2020-02-21 RX ADMIN — SODIUM CHLORIDE 75 MILLILITER(S): 9 INJECTION INTRAMUSCULAR; INTRAVENOUS; SUBCUTANEOUS at 05:32

## 2020-02-21 NOTE — CONSULT NOTE ADULT - ASSESSMENT
Pt with MYRON due to bladder outlet obstruction, BPH, Lt ureteral calculus, newly dz'ed DM.    Myron - polyuric phase 2 to relief of obstruction (7 liters)  - cont NS 75 cc/hr to avoid drop in BP  - repeat kidney sono no hydronephrosis  -monitor closely electrolytes VA, phos, mag and replete accordingly  - repeat kidney sono - no hydronephrosis    Lt distal 12 mm ureteral stone - followed by Urology    HTN - monitor closely for the need of medication  DM -newly diagnosed, Hb A1C check FS    Thank you

## 2020-02-21 NOTE — PROGRESS NOTE ADULT - SUBJECTIVE AND OBJECTIVE BOX
NOEMÍ REYES  74y  Male      Patient is a 74y old  Male who presents with a chief complaint of DAISY, urinary retention, kidney stone (2020 13:23)      INTERVAL HPI/OVERNIGHT EVENTS:  pt seen and examined at bedside   -renew Yoo and continue with IVF; monitor Kidney function  -DNR/DNI     REVIEW OF SYSTEMS:  comfortable     -Vital Signs Last 24 Hrs  T(C): 36.9 (2020 04:45), Max: 37.1 (2020 21:54)  T(F): 98.5 (2020 04:45), Max: 98.8 (2020 21:54)  HR: 100 (2020 04:45) (94 - 113)  BP: 139/77 (2020 04:45) (119/73 - 197/101)  RR: 16 (2020 04:45) (16 - 18)  SpO2: 99% (2020 10:35) (99% - 99%)    PHYSICAL EXAM:  GENERAL: NAD, well-groomed, well-developed  HEAD:  Atraumatic, Normocephalic  EYES: EOMI, PERRLA, conjunctiva and sclera clear  NERVOUS SYSTEM:  Alert & Oriented X 4  CHEST/LUNG: Clear to percussion bilaterally; No rales, rhonchi, wheezing, or rubs  CV/HEART: Regular rate and rhythm; No murmurs, rubs, or gallops  GI/ABDOMEN: Soft, Nontender, Nondistended; Bowel sounds present; yoo present   EXTREMITIES:  2+ Peripheral Pulses, No clubbing, cyanosis, or edema  SKIN: No rashes or lesions    LAB:                        13.7   10.26 )-----------( 129      ( 2020 07:10 )             40.2     02-21    142  |  107  |  37<H>  ----------------------------<  182<H>  4.3   |  23  |  1.9<H>    Ca    7.8<L>      2020 07:10    TPro  6.5  /  Alb  4.0  /  TBili  1.2  /  DBili  x   /  AST  42<H>  /  ALT  22  /  AlkPhos  97  02-20    Daily     Daily Weight in k.8 (2020 14:22)  CAPILLARY BLOOD GLUCOSE      POCT Blood Glucose.: 157 mg/dL (2020 07:31)  POCT Blood Glucose.: 185 mg/dL (2020 20:59)  POCT Blood Glucose.: 190 mg/dL (2020 16:33)    Urinalysis Basic - ( 2020 07:20 )    Color: Yellow / Appearance: Clear / S.010 / pH: x  Gluc: x / Ketone: Negative  / Bili: Negative / Urobili: 0.2 mg/dL   Blood: x / Protein: Negative mg/dL / Nitrite: Negative   Leuk Esterase: Negative / RBC: x / WBC 3-5 /HPF   Sq Epi: x / Non Sq Epi: Occasional /HPF / Bacteria: Few    LIVER FUNCTIONS - ( 2020 07:20 )  Alb: 4.0 g/dL / Pro: 6.5 g/dL / ALK PHOS: 97 U/L / ALT: 22 U/L / AST: 42 U/L / GGT: x           RADIOLOGY:    Imaging Personally Reviewed:  [ Y ] YES  [ ] NO    HEALTH ISSUES - PROBLEM Dx:  HTN (hypertension): HTN (hypertension)  Inguinal hernia: Inguinal hernia  Peripheral neuropathy: Peripheral neuropathy  Urinary retention: Urinary retention  Acute renal failure: Acute renal failure  Kidney stone: Kidney stone    MEDS:  acetaminophen   Tablet .. 650 milliGRAM(s) Oral every 6 hours PRN  chlorhexidine 4% Liquid 1 Application(s) Topical <User Schedule>  dextrose 40% Gel 15 Gram(s) Oral once PRN  dextrose 5%. 1000 milliLiter(s) IV Continuous <Continuous>  dextrose 50% Injectable 12.5 Gram(s) IV Push once  dextrose 50% Injectable 25 Gram(s) IV Push once  dextrose 50% Injectable 25 Gram(s) IV Push once  glucagon  Injectable 1 milliGRAM(s) IntraMuscular once PRN  heparin  Injectable 5000 Unit(s) SubCutaneous every 8 hours  hydrALAZINE 25 milliGRAM(s) Oral every 8 hours PRN  influenza   Vaccine 0.5 milliLiter(s) IntraMuscular once  insulin lispro (HumaLOG) corrective regimen sliding scale   SubCutaneous three times a day before meals  sodium chloride 0.9%. 1000 milliLiter(s) IV Continuous <Continuous>  tamsulosin 0.4 milliGRAM(s) Oral at bedtime

## 2020-02-21 NOTE — PHYSICAL THERAPY INITIAL EVALUATION ADULT - GENERAL OBSERVATIONS, REHAB EVAL
8:50 - 9:13. Chart reviewed. Patient available to be seen for physical therapy. Patient encountered semi-reclined in bed, +IV, +yoo. Denies pain at rest, would like to walk with PT now.

## 2020-02-21 NOTE — PROGRESS NOTE ADULT - ASSESSMENT
A 74 male with pmhx of HTN not on medication currently, ichthyosis, peripheral neuropathy, L hydrocele s/p hydrocelectomy 50 years ago, Right inguinal hernia for 30 years  presents to ed with urinary retention. Pt reports for past 2 days has been having hard time urinating. Pt reports tries to go to the bathroom but can not urinate a lot , has hard time starting to urinate. Pt denies hematuria. Pt reports yesterday started having burning with urination. Pt has not seen a doctor in 10 years. PT denies taking any medications. Pt denies fever, chills, chest pain, shortness of breath,  back pain, nausea, vomiting.         1) DAISY --- post renal/obstructive uropathy/Urinary retention/hydronephrosis   -Julien inserted --- one liter output in ED  - consult   -Nephrology consult; check renal bladder sonogram, Urine protein : Cr ratio    2) Elevated SBP/HTN  -not on meds at home  -monitor bp; added hydralazine     3) Non-compliance with medical care/follow ups    4) dvt and gi ppx   DNR/DNI      # Progress Note Handoff  PENDING as follows  consults: Nephrology,    Test: BMP monitoring   Family discussion: discussed with patient who comprehends his medical care and agreeable for inpatient care  Disposition: home once clinically stable     Attending Physician Dr. Avril Landry # [id number]. A 74 male with pmhx of HTN not on medication currently, ichthyosis, peripheral neuropathy, L hydrocele s/p hydrocelectomy 50 years ago, Right inguinal hernia for 30 years  presents to ed with urinary retention. Pt reports for past 2 days has been having hard time urinating. Pt reports tries to go to the bathroom but can not urinate a lot , has hard time starting to urinate. Pt denies hematuria. Pt reports yesterday started having burning with urination. Pt has not seen a doctor in 10 years. PT denies taking any medications. Pt denies fever, chills, chest pain, shortness of breath,  back pain, nausea, vomiting.         1) DAISY --- post renal/obstructive uropathy/Urinary retention/hydronephrosis   -serum Cr improved to 1.9 from 4.9  -Yoo inserted --- one liter output in ED -- renewed yoo   - consult   -Nephrology consult; check renal bladder sonogram, Urine protein : Cr ratio    2) Elevated SBP/HTN  -not on meds at home  -monitor bp; added hydralazine     3) Non-compliance with medical care/follow ups    4) dvt and gi ppx   DNR/DNI      # Progress Note Handoff  PENDING as follows  consults: Nephrology,    Test: BMP monitoring   Family discussion: discussed with patient who comprehends his medical care and agreeable for inpatient care; updates given to sister at bedside   Disposition: home once clinically stable     Attending Physician Dr. Avril Landry # [id number].

## 2020-02-21 NOTE — PROGRESS NOTE ADULT - ASSESSMENT
74 year old male with PMH of hydrocele, peripheral neuropathy, inguinal hernia, ichthyosis, presents c/o urinary retention for 1 week. On Ct scan pt has found to have bilateral mild hydroureteronephrosis, left distal ureteral obstructing calculus measuring 8 x 7 x 12 mm producing the mild left hydroureteronephrosis and enlarged prostate. Found to be in DAISY, now resolving after yoo placement/IV hydration    Plan:  - c/w yoo   - c/w Flomax  - F/U nephrology consult  - continue to monitor labs for improvement  - will D/W attending regarding any treatment needed for ureteral stone

## 2020-02-21 NOTE — PROGRESS NOTE ADULT - SUBJECTIVE AND OBJECTIVE BOX
S; Pt without any new complaints. Denies back/flank/abdominal pain. Julien in place, patent, yellow urine  O; Vital Signs Last 24 Hrs  T(C): 36.9 (2020 04:45), Max: 37.1 (2020 21:54)  T(F): 98.5 (2020 04:45), Max: 98.8 (2020 21:54)  HR: 100 (2020 04:45) (94 - 113)  BP: 139/77 (2020 04:45) (119/73 - 197/101)  BP(mean): --  RR: 16 (2020 04:45) (16 - 18)  SpO2: 99% (2020 10:35) (99% - 99%)    MEDICATIONS  (STANDING):  chlorhexidine 4% Liquid 1 Application(s) Topical <User Schedule>  dextrose 5%. 1000 milliLiter(s) (50 mL/Hr) IV Continuous <Continuous>  dextrose 50% Injectable 12.5 Gram(s) IV Push once  dextrose 50% Injectable 25 Gram(s) IV Push once  dextrose 50% Injectable 25 Gram(s) IV Push once  heparin  Injectable 5000 Unit(s) SubCutaneous every 8 hours  influenza   Vaccine 0.5 milliLiter(s) IntraMuscular once  insulin lispro (HumaLOG) corrective regimen sliding scale   SubCutaneous three times a day before meals  sodium chloride 0.9%. 1000 milliLiter(s) (75 mL/Hr) IV Continuous <Continuous>  tamsulosin 0.4 milliGRAM(s) Oral at bedtime    MEDICATIONS  (PRN):  acetaminophen   Tablet .. 650 milliGRAM(s) Oral every 6 hours PRN Temp greater or equal to 38C (100.4F), Mild Pain (1 - 3)  dextrose 40% Gel 15 Gram(s) Oral once PRN Blood Glucose LESS THAN 70 milliGRAM(s)/deciliter  glucagon  Injectable 1 milliGRAM(s) IntraMuscular once PRN Glucose LESS THAN 70 milligrams/deciliter  hydrALAZINE 25 milliGRAM(s) Oral every 8 hours PRN Systolic blood pressure >    I&O's Detail    2020 07:01  -  2020 07:00  --------------------------------------------------------  IN:  Total IN: 0 mL    OUT:    Indwelling Catheter - Urethral: 4150 mL    Voided: 3200 mL  Total OUT: 7350 mL    Total NET: -7350 mL    EXAM:  abd: soft, NT/ND, no CVAT B/L    LABS:    POCT Blood Glucose.: 157 mg/dL (2020 07:31)  POCT Blood Glucose.: 185 mg/dL (2020 20:59)  POCT Blood Glucose.: 190 mg/dL (2020 16:33)                          13.7   10.26 )-----------( 129      ( 2020 07:10 )             40.2             142  |  107  |  37<H>  ----------------------------<  182<H>  4.3   |  23  |  1.9<H>      Calcium, Total Serum: 7.8 mg/dL (20 @ 07:10)    Creatinine Trend: 1.9<--, 4.9    LFTs:             6.5  | 1.2  | 42       ------------------[97      ( 2020 07:20 )  4.0  | x    | 22          Lipase:x      Amylase:x         Blood Gas Venous - Lactate: 0.9 mmoL/L (20 @ 08:40)    Lactate, Blood: 1.2 mmol/L (20 @ 07:20)    Urinalysis Basic - ( 2020 07:20 )    Color: Yellow / Appearance: Clear / S.010 / pH: x  Gluc: x / Ketone: Negative  / Bili: Negative / Urobili: 0.2 mg/dL   Blood: x / Protein: Negative mg/dL / Nitrite: Negative   Leuk Esterase: Negative / RBC: x / WBC 3-5 /HPF   Sq Epi: x / Non Sq Epi: Occasional /HPF / Bacteria: Few      RADIOLOGY:  (20 @ 19:38) >  EXAM:  US KIDNEYS AND BLADDER          PROCEDURE DATE:  2020      INTERPRETATION:  CLINICAL HISTORY: Left kidney stone. Urinary retention.    COMPARISON: Same-day CT of the abdomen and pelvis on 2020.    PROCEDURE: Retroperitoneal Ultrasound was performed.    FINDINGS:    RIGHT KIDNEY: Normal in echogenicity, size measuring 9.1 cm in length. No evidence of hydronephrosis, calculus or solid mass. Vascular flow is demonstrated at the hilum.    LEFT KIDNEY: Normal in echogenicity, size measuring 5.5 cm in length. No evidence of hydronephrosis, calculus or solid mass. Vascular flow is demonstrated at the hilum.     URINARY BLADDER: Julien catheter in place. Some debris is seen in the urinary bladder lumen.    PROSTATE: Unable to visualize.    IMPRESSION:  No stones or hydronephrosis.

## 2020-02-21 NOTE — PHYSICAL THERAPY INITIAL EVALUATION ADULT - GAIT DEVIATIONS NOTED, PT EVAL
decreased weight-shifting ability/narrow base of support, decreased heel strike / push off/decreased bella/decreased step length

## 2020-02-21 NOTE — CONSULT NOTE ADULT - SUBJECTIVE AND OBJECTIVE BOX
NEPHROLOGY CONSULTATION NOTE    Patient is a 74y Male whom presented to the hospital with inability to urinate.   Pt has not seen a doctor for 10 years, not aware of any medical conditions, not taking pills at home or NSaids.  On admission found to have DAISY creat 4.0 and retention, Yoo placed, polyuria. Pt also found to have DM and HTN.    PAST MEDICAL & SURGICAL HISTORY:  Peripheral neuropathy  Inguinal hernia  Common ichthyosis  Hydrocele  Hydrocele    Allergies:  No Known Allergies    Home Medications Reviewed  Hospital Medications:   MEDICATIONS  (STANDING):  chlorhexidine 4% Liquid 1 Application(s) Topical <User Schedule>  dextrose 5%. 1000 milliLiter(s) (50 mL/Hr) IV Continuous <Continuous>  dextrose 50% Injectable 12.5 Gram(s) IV Push once  dextrose 50% Injectable 25 Gram(s) IV Push once  dextrose 50% Injectable 25 Gram(s) IV Push once  heparin  Injectable 5000 Unit(s) SubCutaneous every 8 hours  influenza   Vaccine 0.5 milliLiter(s) IntraMuscular once  insulin lispro (HumaLOG) corrective regimen sliding scale   SubCutaneous three times a day before meals  sodium chloride 0.9%. 1000 milliLiter(s) (75 mL/Hr) IV Continuous <Continuous>  tamsulosin 0.4 milliGRAM(s) Oral at bedtime      SOCIAL HISTORY:  Denies ETOH,Smoking,   FAMILY HISTORY:  No pertinent family history in first degree relatives    Yes      REVIEW OF SYSTEMS:  CONSTITUTIONAL: No weakness, fevers or chills  EYES/ENT: No visual changes;  No vertigo or throat pain   NECK: No pain or stiffness  RESPIRATORY: No cough, wheezing, hemoptysis; No shortness of breath  CARDIOVASCULAR: No chest pain or palpitations.  GASTROINTESTINAL: No abdominal or epigastric pain. No nausea, vomiting  GENITOURINARY: Unable to urinate at home  NEUROLOGICAL: No numbness or weakness  SKIN: No itching, burning, rashes, or lesions   VASCULAR: No bilateral lower extremity edema.   All other review of systems is negative unless indicated above.    VITALS:  T(F): 98.5 (20 @ 04:45), Max: 98.8 (20 @ 21:54)  HR: 100 (20 @ 04:45)  BP: 139/77 (20 @ 04:45)  RR: 16 (20 @ 04:45)  SpO2: --     @ 07:01  -   @ 07:00  --------------------------------------------------------  IN: 0 mL / OUT: 7350 mL / NET: -7350 mL     @ 07:  -   @ 14:08  --------------------------------------------------------  IN: 0 mL / OUT: 800 mL / NET: -800 mL          20 @ 07:  -  20 @ 07:00  --------------------------------------------------------  IN: 0 mL / OUT: 4150 mL / NET: -4150 mL    20 @ 07:  -  20 @ 14:08  --------------------------------------------------------  IN: 0 mL / OUT: 800 mL / NET: -800 mL      I&O's Detail    2020 07: 07:00  --------------------------------------------------------  IN:  Total IN: 0 mL    OUT:    Indwelling Catheter - Urethral: 4150 mL    Voided: 3200 mL  Total OUT: 7350 mL    Total NET: -7350 mL      : 14:08  --------------------------------------------------------  IN:  Total IN: 0 mL    OUT:    Indwelling Catheter - Urethral: 800 mL  Total OUT: 800 mL    Total NET: -800 mL            PHYSICAL EXAM:  Constitutional: NAD  HEENT: anicteric sclera, oropharynx clear, MMM  Neck: No JVD  Respiratory: CTAB, no wheezes, rales or rhonchi  Cardiovascular: S1, S2, RRR  Gastrointestinal: BS+, soft, NT/ND  Extremities: No cyanosis or clubbing. No peripheral edema  Neurological: A/O x 3, no focal deficits  Psychiatric: Normal mood, normal affect  : No CVA tenderness. Has yoo.   Skin: No rashes  Vascular Access:    LABS:      142  |  107  |  37<H>  ----------------------------<  182<H>  4.3   |  23  |  1.9<H>    Ca    7.8<L>      2020 07:10    TPro  6.5  /  Alb  4.0  /  TBili  1.2  /  DBili      /  AST  42<H>  /  ALT  22  /  AlkPhos  97      Creatinine Trend: 1.9 <--, 4.9 <--                        13.7   10.26 )-----------( 129      ( 2020 07:10 )             40.2     Urine Studies:  Urinalysis Basic - ( 2020 07:20 )    Color: Yellow / Appearance: Clear / S.010 / pH:   Gluc:  / Ketone: Negative  / Bili: Negative / Urobili: 0.2 mg/dL   Blood:  / Protein: Negative mg/dL / Nitrite: Negative   Leuk Esterase: Negative / RBC:  / WBC 3-5 /HPF   Sq Epi:  / Non Sq Epi: Occasional /HPF / Bacteria: Few                RADIOLOGY & ADDITIONAL STUDIES:    < from: US Kidney and Bladder (20 @ 19:38) >  RIGHT KIDNEY: Normal in echogenicity, size measuring 9.1 cm in length. No evidence of hydronephrosis, calculus or solid mass. Vascular flow is demonstrated at the hilum.    LEFT KIDNEY: Normal in echogenicity, size measuring 5.5 cm in length. No evidence of hydronephrosis, calculus or solid mass. Vascular flow is demonstrated at the hilum.     URINARY BLADDER: Yoo catheter in place. Some debris is seen in the urinary bladder lumen.    PROSTATE: Unable to visualize.    IMPRESSION:  No stones or hydronephrosis.    < end of copied text >

## 2020-02-22 LAB
ANION GAP SERPL CALC-SCNC: 13 MMOL/L — SIGNIFICANT CHANGE UP (ref 7–14)
APPEARANCE UR: CLEAR — SIGNIFICANT CHANGE UP
BACTERIA # UR AUTO: ABNORMAL
BILIRUB UR-MCNC: NEGATIVE — SIGNIFICANT CHANGE UP
BUN SERPL-MCNC: 22 MG/DL — HIGH (ref 10–20)
CALCIUM SERPL-MCNC: 7.8 MG/DL — LOW (ref 8.5–10.1)
CHLORIDE SERPL-SCNC: 105 MMOL/L — SIGNIFICANT CHANGE UP (ref 98–110)
CO2 SERPL-SCNC: 22 MMOL/L — SIGNIFICANT CHANGE UP (ref 17–32)
COLOR SPEC: YELLOW — SIGNIFICANT CHANGE UP
CREAT SERPL-MCNC: 1.2 MG/DL — SIGNIFICANT CHANGE UP (ref 0.7–1.5)
DIFF PNL FLD: ABNORMAL
ESTIMATED AVERAGE GLUCOSE: 177 MG/DL — HIGH (ref 68–114)
GLUCOSE BLDC GLUCOMTR-MCNC: 170 MG/DL — HIGH (ref 70–99)
GLUCOSE BLDC GLUCOMTR-MCNC: 171 MG/DL — HIGH (ref 70–99)
GLUCOSE BLDC GLUCOMTR-MCNC: 174 MG/DL — HIGH (ref 70–99)
GLUCOSE BLDC GLUCOMTR-MCNC: 285 MG/DL — HIGH (ref 70–99)
GLUCOSE SERPL-MCNC: 201 MG/DL — HIGH (ref 70–99)
GLUCOSE UR QL: 500 MG/DL
HBA1C BLD-MCNC: 7.8 % — HIGH (ref 4–5.6)
HCT VFR BLD CALC: 40.9 % — LOW (ref 42–52)
HGB BLD-MCNC: 14 G/DL — SIGNIFICANT CHANGE UP (ref 14–18)
KETONES UR-MCNC: NEGATIVE — SIGNIFICANT CHANGE UP
LEUKOCYTE ESTERASE UR-ACNC: NEGATIVE — SIGNIFICANT CHANGE UP
MCHC RBC-ENTMCNC: 30.2 PG — SIGNIFICANT CHANGE UP (ref 27–31)
MCHC RBC-ENTMCNC: 34.2 G/DL — SIGNIFICANT CHANGE UP (ref 32–37)
MCV RBC AUTO: 88.1 FL — SIGNIFICANT CHANGE UP (ref 80–94)
NITRITE UR-MCNC: NEGATIVE — SIGNIFICANT CHANGE UP
NRBC # BLD: 0 /100 WBCS — SIGNIFICANT CHANGE UP (ref 0–0)
PH UR: 6.5 — SIGNIFICANT CHANGE UP (ref 5–8)
PLATELET # BLD AUTO: 147 K/UL — SIGNIFICANT CHANGE UP (ref 130–400)
POTASSIUM SERPL-MCNC: 3.9 MMOL/L — SIGNIFICANT CHANGE UP (ref 3.5–5)
POTASSIUM SERPL-SCNC: 3.9 MMOL/L — SIGNIFICANT CHANGE UP (ref 3.5–5)
PROT UR-MCNC: NEGATIVE MG/DL — SIGNIFICANT CHANGE UP
RBC # BLD: 4.64 M/UL — LOW (ref 4.7–6.1)
RBC # FLD: 12 % — SIGNIFICANT CHANGE UP (ref 11.5–14.5)
RBC CASTS # UR COMP ASSIST: ABNORMAL /HPF
SODIUM SERPL-SCNC: 140 MMOL/L — SIGNIFICANT CHANGE UP (ref 135–146)
SP GR SPEC: 1.01 — SIGNIFICANT CHANGE UP (ref 1.01–1.03)
UROBILINOGEN FLD QL: 1 MG/DL (ref 0.2–0.2)
WBC # BLD: 11.41 K/UL — HIGH (ref 4.8–10.8)
WBC # FLD AUTO: 11.41 K/UL — HIGH (ref 4.8–10.8)
WBC UR QL: SIGNIFICANT CHANGE UP /HPF

## 2020-02-22 PROCEDURE — 99233 SBSQ HOSP IP/OBS HIGH 50: CPT

## 2020-02-22 PROCEDURE — 71045 X-RAY EXAM CHEST 1 VIEW: CPT | Mod: 26

## 2020-02-22 RX ORDER — AMLODIPINE BESYLATE 2.5 MG/1
5 TABLET ORAL EVERY 24 HOURS
Refills: 0 | Status: DISCONTINUED | OUTPATIENT
Start: 2020-02-22 | End: 2020-02-25

## 2020-02-22 RX ORDER — AMLODIPINE BESYLATE 2.5 MG/1
2.5 TABLET ORAL ONCE
Refills: 0 | Status: COMPLETED | OUTPATIENT
Start: 2020-02-22 | End: 2020-02-22

## 2020-02-22 RX ORDER — CEFTRIAXONE 500 MG/1
1000 INJECTION, POWDER, FOR SOLUTION INTRAMUSCULAR; INTRAVENOUS EVERY 24 HOURS
Refills: 0 | Status: DISCONTINUED | OUTPATIENT
Start: 2020-02-22 | End: 2020-02-24

## 2020-02-22 RX ORDER — SODIUM CHLORIDE 0.65 %
1 AEROSOL, SPRAY (ML) NASAL
Refills: 0 | Status: DISCONTINUED | OUTPATIENT
Start: 2020-02-22 | End: 2020-02-25

## 2020-02-22 RX ORDER — ONDANSETRON 8 MG/1
4 TABLET, FILM COATED ORAL EVERY 8 HOURS
Refills: 0 | Status: DISCONTINUED | OUTPATIENT
Start: 2020-02-22 | End: 2020-02-25

## 2020-02-22 RX ORDER — HYDRALAZINE HCL 50 MG
50 TABLET ORAL EVERY 8 HOURS
Refills: 0 | Status: DISCONTINUED | OUTPATIENT
Start: 2020-02-22 | End: 2020-02-25

## 2020-02-22 RX ADMIN — Medication 1: at 07:46

## 2020-02-22 RX ADMIN — Medication 3: at 11:28

## 2020-02-22 RX ADMIN — AMLODIPINE BESYLATE 2.5 MILLIGRAM(S): 2.5 TABLET ORAL at 06:54

## 2020-02-22 RX ADMIN — AMLODIPINE BESYLATE 5 MILLIGRAM(S): 2.5 TABLET ORAL at 10:53

## 2020-02-22 RX ADMIN — Medication 25 MILLIGRAM(S): at 05:13

## 2020-02-22 RX ADMIN — CEFTRIAXONE 100 MILLIGRAM(S): 500 INJECTION, POWDER, FOR SOLUTION INTRAMUSCULAR; INTRAVENOUS at 11:27

## 2020-02-22 RX ADMIN — HEPARIN SODIUM 5000 UNIT(S): 5000 INJECTION INTRAVENOUS; SUBCUTANEOUS at 14:06

## 2020-02-22 RX ADMIN — TAMSULOSIN HYDROCHLORIDE 0.4 MILLIGRAM(S): 0.4 CAPSULE ORAL at 22:11

## 2020-02-22 RX ADMIN — HEPARIN SODIUM 5000 UNIT(S): 5000 INJECTION INTRAVENOUS; SUBCUTANEOUS at 22:11

## 2020-02-22 RX ADMIN — HEPARIN SODIUM 5000 UNIT(S): 5000 INJECTION INTRAVENOUS; SUBCUTANEOUS at 05:11

## 2020-02-22 RX ADMIN — Medication 50 MILLIGRAM(S): at 14:06

## 2020-02-22 RX ADMIN — ONDANSETRON 4 MILLIGRAM(S): 8 TABLET, FILM COATED ORAL at 17:09

## 2020-02-22 RX ADMIN — Medication 1: at 17:11

## 2020-02-22 RX ADMIN — Medication 50 MILLIGRAM(S): at 21:23

## 2020-02-22 RX ADMIN — Medication 650 MILLIGRAM(S): at 22:20

## 2020-02-22 NOTE — PROGRESS NOTE ADULT - SUBJECTIVE AND OBJECTIVE BOX
Patient seen.  Denies flank pain, or hematuria.  No nausea or vomiting.  Had a T=101.2F earlier today  Abdomen soft  :  No CVA tenderness bilaterally.

## 2020-02-22 NOTE — PROGRESS NOTE ADULT - ASSESSMENT
Impression:  75 y/o male with Left distal 8 x 7 x 12 mm calculus and mild B/L hydronephrosis and urinary retention.        Plan:  - Case d/w Dr. Campbell and states monitor for continued fevers.  If continues with fevers then to inform him.  He recommends continue with the Ivabx with Ceftriaxone.    - Urology plan otherwise is to continue with the Flomax and when medically cleared to D/C home with Julien and leg bag and follow up for outpatient management of left ureteral stone.    - Medical attending ordered fever work up.  - Discussed with Dr. Landry.    - Discussed with patient and his wife who is at bedside.

## 2020-02-22 NOTE — PROGRESS NOTE ADULT - ASSESSMENT
A 74 male with pmhx of HTN not on medication currently, ichthyosis, peripheral neuropathy, L hydrocele s/p hydrocelectomy 50 years ago, Right inguinal hernia for 30 years  presents to ed with urinary retention. Pt reports for past 2 days has been having hard time urinating. Pt reports tries to go to the bathroom but can not urinate a lot , has hard time starting to urinate. Pt denies hematuria. Pt reports yesterday started having burning with urination. Pt has not seen a doctor in 10 years. PT denies taking any medications. Pt denies fever, chills, chest pain, shortness of breath,  back pain, nausea, vomiting.         1) DAISY --- post renal/obstructive uropathy/Urinary retention/hydronephrosis   -serum Cr improved to 1.2 from 4.9  -Julien renewed --- one liter output in ED -- TOV in 24 hrs   - following  -Nephrology noted     2) Fevers  -stat chest xray, U/a, blood and urine cultures, and empiric abx for now; if continues to spike high fevers, will swab for flu     3) Elevated SBP/HTN  -not on meds at home  -monitor bp; added hydralazine standing dose + norvasc     4) Non-compliance with medical care/follow ups    5) dvt and gi ppx     DNR/DNI      # Progress Note Handoff  PENDING as follows  consults: Nephrology,  follow up noted   Test: CBC, blood cultures   Family discussion: discussed with patient who comprehends his medical care and agreeable for inpatient care; updates given to sister at bedside yesterday   Disposition: home once clinically stable --- now with fevers     Attending Physician Dr. Avril Landry # [id number]. A 74 male with pmhx of HTN not on medication currently, ichthyosis, peripheral neuropathy, L hydrocele s/p hydrocelectomy 50 years ago, Right inguinal hernia for 30 years  presents to ed with urinary retention. Pt reports for past 2 days has been having hard time urinating. Pt reports tries to go to the bathroom but can not urinate a lot , has hard time starting to urinate. Pt denies hematuria. Pt reports yesterday started having burning with urination. Pt has not seen a doctor in 10 years. PT denies taking any medications. Pt denies fever, chills, chest pain, shortness of breath,  back pain, nausea, vomiting.         1) DAISY --- post renal/obstructive uropathy/Urinary retention/hydronephrosis   -serum Cr improved to 1.2 from 4.9  -Julien renewed --- one liter output in ED -- TOV in 24 hrs   - following  -Nephrology noted     2) Fevers/Leukocytosis  -stat chest xray, U/a, blood and urine cultures, and empiric abx for now; if continues to spike high fevers, will swab for flu     3) Elevated SBP/HTN  -not on meds at home  -monitor bp; added hydralazine standing dose + norvasc     4) Non-compliance with medical care/follow ups    5) dvt and gi ppx     DNR/DNI      # Progress Note Handoff  PENDING as follows  consults: Nephrology,  follow up noted   Test: CBC, blood cultures   Family discussion: discussed with patient who comprehends his medical care and agreeable for inpatient care; updates given to sister at bedside yesterday   Disposition: home once clinically stable --- now with fevers     Attending Physician Dr. Avril Landry # [id number].

## 2020-02-22 NOTE — PROGRESS NOTE ADULT - SUBJECTIVE AND OBJECTIVE BOX
The patient is a Patient is a 74y old  Male who presents with a chief complaint of DAISY, urinary retention, kidney stone (2020 09:45)    Patient seen & examined.  RN reports Tmax of 101.2 last night but afebrile since.    Patient reports no significant abdominal or flank pain, denies subjective fever, chills, tremors, N/V/D.    Patient reports Julien functioning well and denies dysuria, pyuria or hematuria.            I&O's Detail    2020 07:01  -  2020 07:00  --------------------------------------------------------  IN:  Total IN: 0 mL    OUT:    Indwelling Catheter - Urethral: 1600 mL    Voided: 600 mL  Total OUT: 2200 mL    Total NET: -2200 mL            Vital Signs Last 24 Hrs  T(C): 37.1 (2020 10:50), Max: 38.4 (2020 21:05)  T(F): 98.8 (2020 10:50), Max: 101.2 (2020 21:05)  HR: 129 (2020 10:50) (112 - 129)  BP: 161/93 (2020 10:50) (146/76 - 189/99)  RR: 16 (2020 10:50) (16 - 17)          Physical exam:  General: Wd, Wn, conversant in NAD.  Abdomen:  + BS, soft, no distention, non-tender, No rebound, guarding or peritoneal signs.  Genitourinary:  Julien catheter in place draining yellow urine.  No suprapubic tenderness, No CVAT.           LABS:                        14.0   11.41 )-----------( 147      ( 2020 07:10 )             40.9     02    140  |  105  |  22<H>  ----------------------------<  201<H>  3.9   |  22  |  1.2    Ca    7.8<L>      2020 07:10        Urinalysis Basic - ( 2020 11:21 )    Color: Yellow / Appearance: Clear / S.015 / pH: x  Gluc: x / Ketone: Negative  / Bili: Negative / Urobili: 1.0 mg/dL   Blood: x / Protein: Negative mg/dL / Nitrite: Negative   Leuk Esterase: Negative / RBC: 6-10 /HPF / WBC 1-2 /HPF   Sq Epi: x / Non Sq Epi: x / Bacteria: Few        Urine Culture:     Culture - Urine (20 @ 08:00)    Specimen Source: .Urine Clean Catch (Midstream)    Culture Results:   <10,000 CFU/mL Normal Urogenital Lynn

## 2020-02-22 NOTE — PROGRESS NOTE ADULT - SUBJECTIVE AND OBJECTIVE BOX
NOEMÍ REYES  74y  Male      Patient is a 74y old  Male who presents with a chief complaint of DAISY, urinary retention, kidney stone (2020 13:23)      INTERVAL HPI/OVERNIGHT EVENTS:  pt seen and examined at bedside   -tmax 101 not notified to MD on call and work up delayed  -stat chest xray, U/a, blood and urine cultures, and empiric abx for now; if continues to spike high fevers, will swab for flu   -renewed Yoo   -kidney function improved  -d/c IVF  -TOV in 24 hrs with BMP monitoring   -DNR/DNI     REVIEW OF SYSTEMS:  comfortable     Vital Signs Last 24 Hrs  T(C): 36.8 (2020 05:00), Max: 38.4 (2020 21:05)  T(F): 98.3 (2020 05:00), Max: 101.2 (2020 21:05)  HR: 113 (2020 06:30) (112 - 119)  BP: 176/88 (2020 06:30) (146/76 - 189/99)  RR: 17 (2020 05:00) (16 - 17)    PHYSICAL EXAM:  GENERAL: NAD, speaking appropriately  HEAD:  Atraumatic, Normocephalic  EYES: EOMI, PERRLA, conjunctiva and sclera clear  NERVOUS SYSTEM:  Alert & Oriented X 4  CHEST/LUNG: Clear to percussion bilaterally; No rales, rhonchi, wheezing, or rubs  CV/HEART: Regular rate and rhythm; No murmurs, rubs, or gallops  GI/ABDOMEN: Soft, Nontender, Nondistended; Bowel sounds present; yoo present   EXTREMITIES:  2+ Peripheral Pulses, No clubbing, cyanosis, or edema  SKIN: No rashes or lesions    LAB:                             14.0   11.41 )-----------( 147      ( 2020 07:10 )             40.9               02-22    140  |  105  |  22<H>  ----------------------------<  201<H>  3.9   |  22  |  1.2    Ca    7.8<L>      2020 07:10      02-21    142  |  107  |  37<H>  ----------------------------<  182<H>  4.3   |  23  |  1.9<H>    Ca    7.8<L>      2020 07:10    TPro  6.5  /  Alb  4.0  /  TBili  1.2  /  DBili  x   /  AST  42<H>  /  ALT  22  /  AlkPhos  97  02-20    Daily     Daily Weight in k.8 (2020 14:22)  CAPILLARY BLOOD GLUCOSE      POCT Blood Glucose.: 157 mg/dL (2020 07:31)  POCT Blood Glucose.: 185 mg/dL (2020 20:59)  POCT Blood Glucose.: 190 mg/dL (2020 16:33)    Urinalysis Basic - ( 2020 07:20 )    Color: Yellow / Appearance: Clear / S.010 / pH: x  Gluc: x / Ketone: Negative  / Bili: Negative / Urobili: 0.2 mg/dL   Blood: x / Protein: Negative mg/dL / Nitrite: Negative   Leuk Esterase: Negative / RBC: x / WBC 3-5 /HPF   Sq Epi: x / Non Sq Epi: Occasional /HPF / Bacteria: Few    LIVER FUNCTIONS - ( 2020 07:20 )  Alb: 4.0 g/dL / Pro: 6.5 g/dL / ALK PHOS: 97 U/L / ALT: 22 U/L / AST: 42 U/L / GGT: x           RADIOLOGY:    Imaging Personally Reviewed:  [ Y ] YES  [ ] NO    HEALTH ISSUES - PROBLEM Dx:  HTN (hypertension): HTN (hypertension)  Inguinal hernia: Inguinal hernia  Peripheral neuropathy: Peripheral neuropathy  Urinary retention: Urinary retention  Acute renal failure: Acute renal failure  Kidney stone: Kidney stone    MEDICATIONS  (STANDING):  amLODIPine   Tablet 5 milliGRAM(s) Oral every 24 hours  cefTRIAXone   IVPB 1000 milliGRAM(s) IV Intermittent every 24 hours  chlorhexidine 4% Liquid 1 Application(s) Topical <User Schedule>  dextrose 5%. 1000 milliLiter(s) (50 mL/Hr) IV Continuous <Continuous>  dextrose 50% Injectable 12.5 Gram(s) IV Push once  dextrose 50% Injectable 25 Gram(s) IV Push once  dextrose 50% Injectable 25 Gram(s) IV Push once  heparin  Injectable 5000 Unit(s) SubCutaneous every 8 hours  hydrALAZINE 50 milliGRAM(s) Oral every 8 hours  influenza   Vaccine 0.5 milliLiter(s) IntraMuscular once  insulin lispro (HumaLOG) corrective regimen sliding scale   SubCutaneous three times a day before meals  sodium chloride 0.9%. 1000 milliLiter(s) (75 mL/Hr) IV Continuous <Continuous>  tamsulosin 0.4 milliGRAM(s) Oral at bedtime    MEDICATIONS  (PRN):  acetaminophen   Tablet .. 650 milliGRAM(s) Oral every 6 hours PRN Temp greater or equal to 38C (100.4F), Mild Pain (1 - 3)  dextrose 40% Gel 15 Gram(s) Oral once PRN Blood Glucose LESS THAN 70 milliGRAM(s)/deciliter  glucagon  Injectable 1 milliGRAM(s) IntraMuscular once PRN Glucose LESS THAN 70 milligrams/deciliter  hydrALAZINE 25 milliGRAM(s) Oral every 8 hours PRN Systolic blood pressure >

## 2020-02-23 LAB
GLUCOSE BLDC GLUCOMTR-MCNC: 155 MG/DL — HIGH (ref 70–99)
GLUCOSE BLDC GLUCOMTR-MCNC: 172 MG/DL — HIGH (ref 70–99)
GLUCOSE BLDC GLUCOMTR-MCNC: 174 MG/DL — HIGH (ref 70–99)
GLUCOSE BLDC GLUCOMTR-MCNC: 208 MG/DL — HIGH (ref 70–99)

## 2020-02-23 PROCEDURE — 99233 SBSQ HOSP IP/OBS HIGH 50: CPT

## 2020-02-23 RX ORDER — HYDROCORTISONE 1 %
1 OINTMENT (GRAM) TOPICAL DAILY
Refills: 0 | Status: DISCONTINUED | OUTPATIENT
Start: 2020-02-23 | End: 2020-02-25

## 2020-02-23 RX ORDER — METFORMIN HYDROCHLORIDE 850 MG/1
500 TABLET ORAL
Refills: 0 | Status: DISCONTINUED | OUTPATIENT
Start: 2020-02-23 | End: 2020-02-25

## 2020-02-23 RX ADMIN — Medication 1 SUPPOSITORY(S): at 11:45

## 2020-02-23 RX ADMIN — METFORMIN HYDROCHLORIDE 500 MILLIGRAM(S): 850 TABLET ORAL at 17:04

## 2020-02-23 RX ADMIN — HEPARIN SODIUM 5000 UNIT(S): 5000 INJECTION INTRAVENOUS; SUBCUTANEOUS at 05:08

## 2020-02-23 RX ADMIN — Medication 650 MILLIGRAM(S): at 08:10

## 2020-02-23 RX ADMIN — AMLODIPINE BESYLATE 5 MILLIGRAM(S): 2.5 TABLET ORAL at 09:43

## 2020-02-23 RX ADMIN — Medication 2: at 11:45

## 2020-02-23 RX ADMIN — HEPARIN SODIUM 5000 UNIT(S): 5000 INJECTION INTRAVENOUS; SUBCUTANEOUS at 13:54

## 2020-02-23 RX ADMIN — Medication 1: at 07:33

## 2020-02-23 RX ADMIN — ONDANSETRON 4 MILLIGRAM(S): 8 TABLET, FILM COATED ORAL at 13:53

## 2020-02-23 RX ADMIN — Medication 650 MILLIGRAM(S): at 17:05

## 2020-02-23 RX ADMIN — CHLORHEXIDINE GLUCONATE 1 APPLICATION(S): 213 SOLUTION TOPICAL at 05:07

## 2020-02-23 RX ADMIN — Medication 50 MILLIGRAM(S): at 15:10

## 2020-02-23 RX ADMIN — HEPARIN SODIUM 5000 UNIT(S): 5000 INJECTION INTRAVENOUS; SUBCUTANEOUS at 21:50

## 2020-02-23 RX ADMIN — Medication 650 MILLIGRAM(S): at 17:40

## 2020-02-23 RX ADMIN — Medication 1 SPRAY(S): at 09:42

## 2020-02-23 RX ADMIN — Medication 1: at 17:07

## 2020-02-23 RX ADMIN — Medication 50 MILLIGRAM(S): at 21:50

## 2020-02-23 RX ADMIN — Medication 650 MILLIGRAM(S): at 07:37

## 2020-02-23 RX ADMIN — TAMSULOSIN HYDROCHLORIDE 0.4 MILLIGRAM(S): 0.4 CAPSULE ORAL at 21:50

## 2020-02-23 RX ADMIN — CEFTRIAXONE 100 MILLIGRAM(S): 500 INJECTION, POWDER, FOR SOLUTION INTRAMUSCULAR; INTRAVENOUS at 09:36

## 2020-02-23 RX ADMIN — Medication 50 MILLIGRAM(S): at 05:08

## 2020-02-23 RX ADMIN — METFORMIN HYDROCHLORIDE 500 MILLIGRAM(S): 850 TABLET ORAL at 11:44

## 2020-02-23 NOTE — PROGRESS NOTE ADULT - ASSESSMENT
A 74 male with pmhx of HTN not on medication currently, ichthyosis, peripheral neuropathy, L hydrocele s/p hydrocelectomy 50 years ago, Right inguinal hernia for 30 years  presents to ed with urinary retention. Pt reports for past 2 days has been having hard time urinating. Pt reports tries to go to the bathroom but can not urinate a lot , has hard time starting to urinate. Pt denies hematuria. Pt reports yesterday started having burning with urination. Pt has not seen a doctor in 10 years. PT denies taking any medications. Pt denies fever, chills, chest pain, shortness of breath,  back pain, nausea, vomiting.         1) DAISY --- post renal/obstructive uropathy/Urinary retention/hydronephrosis   -serum Cr improved to 1.2 from 4.9 --- am labs pending and will follow   -Yoo renewed --- one liter output in ED -- will go home with yoo as per    - follow up noted   -Nephrology noted     2) Fevers/Leukocytosis  -chest xray -ve   -U/a no significant findings;   -blood and urine cultures Pending  -c/w empiric abx for now; if continues to spike high fevers, will swab for flu     3) HTN  -not on meds at home  -monitor bp; added hydralazine standing dose + norvasc   -stable bp on current regimen     4) DM II (new onset)  -hba1c ~ 7.8  -started on metformin  -dietary eval; outpatient close PMD monitoring    5) Non-compliance with medical care/follow ups    6) dvt and gi ppx     DNR/DNI      # Progress Note Handoff  PENDING as follows  consults: Nephrology,  follow up noted   Test: blood cultures Pending   Family discussion: discussed with patient who comprehends his medical care and agreeable for inpatient care; updates given to sister at bedside this morning   Disposition: home once clinically stable --- monitor fevers     Attending Physician Dr. Avril Landry # [id number].

## 2020-02-23 NOTE — PROGRESS NOTE ADULT - ASSESSMENT
Impression:  73 y/o male with Left distal 8 x 7 x 12 mm calculus and mild B/L hydronephrosis and urinary retention now with Julien catheter.          Plan:  - Continue with the Ivabx with Ceftriaxone.  Continue to monitor for fevers.    - Urology plan (If remains afebrile) is to continue with the Flomax and when medically cleared to D/C home with Julien and leg bag and follow up for outpatient management of left ureteral stone and TOV.    - Will d/w Dr. Allen.

## 2020-02-23 NOTE — PROGRESS NOTE ADULT - SUBJECTIVE AND OBJECTIVE BOX
NOEMÍ REYES  74y  Male      Patient is a 74y old  Male who presents with a chief complaint of DAISY, urinary retention, kidney stone (2020 13:23)      INTERVAL HPI/OVERNIGHT EVENTS:  pt seen and examined at bedside   -fevers resolving; work up negative so far; follow up cultures  -ambulate the patient  -hba1c consistent with DM II; started on metformin with finger stick monitoring   -pt is very non-compliant with medical care and does not follow up with any doctors ---- now with Hypertensive med + diabetic meds   -DNR/DNI     REVIEW OF SYSTEMS:  comfortable sitting up in chair       Vital Signs Last 24 Hrs  T(C): 37.5 (2020 05:19), Max: 37.7 (2020 21:43)  T(F): 99.5 (2020 05:19), Max: 99.8 (2020 21:43)  HR: 127 (2020 08:13) (121 - 129)  BP: 132/63 (2020 08:13) (132/63 - 161/93)  RR: 16 (2020 08:13) (16 - 16)  SpO2: 96% (2020 08:13) (96% - 96%)    PHYSICAL EXAM:  GENERAL: NAD, speaking appropriately  HEAD:  Atraumatic, Normocephalic  EYES: EOMI, PERRLA, conjunctiva and sclera clear  NERVOUS SYSTEM:  Alert & Oriented X 4  CHEST/LUNG: Clear to percussion bilaterally; No rales, rhonchi, wheezing, or rubs  CV/HEART: Regular rate and rhythm; No murmurs, rubs, or gallops  GI/ABDOMEN: Soft, Nontender, Nondistended; Bowel sounds present; yoo present   EXTREMITIES:  2+ Peripheral Pulses, No clubbing, cyanosis, or edema  SKIN: No rashes or lesions    LAB:     am labs pending                           14.0   11.41 )-----------( 147      ( 2020 07:10 )             40.9               02-22    140  |  105  |  22<H>  ----------------------------<  201<H>  3.9   |  22  |  1.2    Ca    7.8<L>      2020 07:10      02-    142  |  107  |  37<H>  ----------------------------<  182<H>  4.3   |  23  |  1.9<H>    Ca    7.8<L>      2020 07:10    TPro  6.5  /  Alb  4.0  /  TBili  1.2  /  DBili  x   /  AST  42<H>  /  ALT  22  /  AlkPhos  97  02-20    Daily     Daily Weight in k.8 (2020 14:22)  CAPILLARY BLOOD GLUCOSE      POCT Blood Glucose.: 157 mg/dL (2020 07:31)  POCT Blood Glucose.: 185 mg/dL (2020 20:59)  POCT Blood Glucose.: 190 mg/dL (2020 16:33)    Urinalysis Basic - ( 2020 07:20 )    Color: Yellow / Appearance: Clear / S.010 / pH: x  Gluc: x / Ketone: Negative  / Bili: Negative / Urobili: 0.2 mg/dL   Blood: x / Protein: Negative mg/dL / Nitrite: Negative   Leuk Esterase: Negative / RBC: x / WBC 3-5 /HPF   Sq Epi: x / Non Sq Epi: Occasional /HPF / Bacteria: Few    LIVER FUNCTIONS - ( 2020 07:20 )  Alb: 4.0 g/dL / Pro: 6.5 g/dL / ALK PHOS: 97 U/L / ALT: 22 U/L / AST: 42 U/L / GGT: x           RADIOLOGY:    Imaging Personally Reviewed:  [ Y ] YES  [ ] NO    HEALTH ISSUES - PROBLEM Dx:  HTN (hypertension): HTN (hypertension)  Inguinal hernia: Inguinal hernia  Peripheral neuropathy: Peripheral neuropathy  Urinary retention: Urinary retention  Acute renal failure: Acute renal failure  Kidney stone: Kidney stone      MEDICATIONS  (STANDING):  amLODIPine   Tablet 5 milliGRAM(s) Oral every 24 hours  cefTRIAXone   IVPB 1000 milliGRAM(s) IV Intermittent every 24 hours  chlorhexidine 4% Liquid 1 Application(s) Topical <User Schedule>  dextrose 5%. 1000 milliLiter(s) (50 mL/Hr) IV Continuous <Continuous>  dextrose 50% Injectable 12.5 Gram(s) IV Push once  dextrose 50% Injectable 25 Gram(s) IV Push once  dextrose 50% Injectable 25 Gram(s) IV Push once  heparin  Injectable 5000 Unit(s) SubCutaneous every 8 hours  hydrALAZINE 50 milliGRAM(s) Oral every 8 hours  hydrocortisone hemorrhoidal Suppository 1 Suppository(s) Rectal daily  influenza   Vaccine 0.5 milliLiter(s) IntraMuscular once  insulin lispro (HumaLOG) corrective regimen sliding scale   SubCutaneous three times a day before meals  metFORMIN 500 milliGRAM(s) Oral two times a day with meals  sodium chloride 0.9%. 1000 milliLiter(s) (75 mL/Hr) IV Continuous <Continuous>  tamsulosin 0.4 milliGRAM(s) Oral at bedtime    MEDICATIONS  (PRN):  acetaminophen   Tablet .. 650 milliGRAM(s) Oral every 6 hours PRN Temp greater or equal to 38C (100.4F), Mild Pain (1 - 3)  dextrose 40% Gel 15 Gram(s) Oral once PRN Blood Glucose LESS THAN 70 milliGRAM(s)/deciliter  glucagon  Injectable 1 milliGRAM(s) IntraMuscular once PRN Glucose LESS THAN 70 milligrams/deciliter  hydrALAZINE 25 milliGRAM(s) Oral every 8 hours PRN Systolic blood pressure >  ondansetron Injectable 4 milliGRAM(s) IV Push every 8 hours PRN Nausea and/or Vomiting  sodium chloride 0.65% Nasal 1 Spray(s) Both Nostrils two times a day PRN Nasal Congestion

## 2020-02-23 NOTE — PROGRESS NOTE ADULT - SUBJECTIVE AND OBJECTIVE BOX
The patient is a Patient is a 74y old  Male who presents with a chief complaint of DAISY, urinary retention, kidney stone (2020 09:41).    Patient seen & examined.  No acute events noted overnight.    Remains afebrile since spiked on Friday evening.  Tmax =99.8.  Patient denies new complaints and other than some discomfort with the Julien catheter, he denies abdominal pain.   Julien in place and functioning well with yellow urine noted.    Patient denies dysuria, pyuria, hematuria, fever, chills, tremors, CP or SOB.             I&O's Detail    2020 07:01  -  2020 07:00  --------------------------------------------------------  IN:  Total IN: 0 mL    OUT:    Indwelling Catheter - Urethral: 3150 mL  Total OUT: 3150 mL  Total NET: -3150 mL          Vital Signs Last 24 Hrs  T(C): 37.5 (2020 05:19), Max: 37.7 (2020 21:43)  T(F): 99.5 (2020 05:19), Max: 99.8 (2020 21:43)  HR: 127 (2020 08:13) (121 - 127)  BP: 132/63 (2020 08:13) (132/63 - 153/83)  RR: 16 (2020 08:13) (16 - 16)  SpO2: 96% (2020 08:13) (96% - 96%)          Physical exam  General: Wd, Wn, conversant in NAD.  Abdomen:  + BS, soft, no distention, non-tender, No rebound, guarding or peritoneal signs.  Genitourinary:  Julien catheter in place with yellow urine noted.  No suprapubic tenderness.  No CVAT.           LABS:                          14.0   11.41 )-----------( 147      ( 2020 07:10 )               40.9     02-22    140  |  105  |  22<H>  ----------------------------<  201<H>  3.9   |  22  |  1.2    Ca    7.8<L>      2020 07:10          Urinalysis Basic - ( 2020 11:21 )    Color: Yellow / Appearance: Clear / S.015 / pH: x  Gluc: x / Ketone: Negative  / Bili: Negative / Urobili: 1.0 mg/dL   Blood: x / Protein: Negative mg/dL / Nitrite: Negative   Leuk Esterase: Negative / RBC: 6-10 /HPF / WBC 1-2 /HPF   Sq Epi: x / Non Sq Epi: x / Bacteria: Few

## 2020-02-24 ENCOUNTER — TRANSCRIPTION ENCOUNTER (OUTPATIENT)
Age: 75
End: 2020-02-24

## 2020-02-24 PROBLEM — G62.9 POLYNEUROPATHY, UNSPECIFIED: Chronic | Status: ACTIVE | Noted: 2020-02-20

## 2020-02-24 PROBLEM — Q80.0 ICHTHYOSIS VULGARIS: Chronic | Status: ACTIVE | Noted: 2020-02-20

## 2020-02-24 PROBLEM — K40.90 UNILATERAL INGUINAL HERNIA, WITHOUT OBSTRUCTION OR GANGRENE, NOT SPECIFIED AS RECURRENT: Chronic | Status: ACTIVE | Noted: 2020-02-20

## 2020-02-24 LAB
-  AMPICILLIN: SIGNIFICANT CHANGE UP
-  CIPROFLOXACIN: SIGNIFICANT CHANGE UP
-  LEVOFLOXACIN: SIGNIFICANT CHANGE UP
-  NITROFURANTOIN: SIGNIFICANT CHANGE UP
-  TETRACYCLINE: SIGNIFICANT CHANGE UP
-  VANCOMYCIN: SIGNIFICANT CHANGE UP
ANION GAP SERPL CALC-SCNC: 14 MMOL/L — SIGNIFICANT CHANGE UP (ref 7–14)
BUN SERPL-MCNC: 18 MG/DL — SIGNIFICANT CHANGE UP (ref 10–20)
CALCIUM SERPL-MCNC: 8 MG/DL — LOW (ref 8.5–10.1)
CHLORIDE SERPL-SCNC: 105 MMOL/L — SIGNIFICANT CHANGE UP (ref 98–110)
CO2 SERPL-SCNC: 21 MMOL/L — SIGNIFICANT CHANGE UP (ref 17–32)
CREAT SERPL-MCNC: 1.2 MG/DL — SIGNIFICANT CHANGE UP (ref 0.7–1.5)
CULTURE RESULTS: SIGNIFICANT CHANGE UP
GLUCOSE BLDC GLUCOMTR-MCNC: 134 MG/DL — HIGH (ref 70–99)
GLUCOSE BLDC GLUCOMTR-MCNC: 161 MG/DL — HIGH (ref 70–99)
GLUCOSE BLDC GLUCOMTR-MCNC: 179 MG/DL — HIGH (ref 70–99)
GLUCOSE BLDC GLUCOMTR-MCNC: 244 MG/DL — HIGH (ref 70–99)
GLUCOSE SERPL-MCNC: 218 MG/DL — HIGH (ref 70–99)
HCT VFR BLD CALC: 37.5 % — LOW (ref 42–52)
HGB BLD-MCNC: 12.6 G/DL — LOW (ref 14–18)
MCHC RBC-ENTMCNC: 30.1 PG — SIGNIFICANT CHANGE UP (ref 27–31)
MCHC RBC-ENTMCNC: 33.6 G/DL — SIGNIFICANT CHANGE UP (ref 32–37)
MCV RBC AUTO: 89.5 FL — SIGNIFICANT CHANGE UP (ref 80–94)
METHOD TYPE: SIGNIFICANT CHANGE UP
NRBC # BLD: 0 /100 WBCS — SIGNIFICANT CHANGE UP (ref 0–0)
ORGANISM # SPEC MICROSCOPIC CNT: SIGNIFICANT CHANGE UP
ORGANISM # SPEC MICROSCOPIC CNT: SIGNIFICANT CHANGE UP
PLATELET # BLD AUTO: 209 K/UL — SIGNIFICANT CHANGE UP (ref 130–400)
POTASSIUM SERPL-MCNC: 4 MMOL/L — SIGNIFICANT CHANGE UP (ref 3.5–5)
POTASSIUM SERPL-SCNC: 4 MMOL/L — SIGNIFICANT CHANGE UP (ref 3.5–5)
RBC # BLD: 4.19 M/UL — LOW (ref 4.7–6.1)
RBC # FLD: 12.2 % — SIGNIFICANT CHANGE UP (ref 11.5–14.5)
SODIUM SERPL-SCNC: 140 MMOL/L — SIGNIFICANT CHANGE UP (ref 135–146)
SPECIMEN SOURCE: SIGNIFICANT CHANGE UP
WBC # BLD: 12.87 K/UL — HIGH (ref 4.8–10.8)
WBC # FLD AUTO: 12.87 K/UL — HIGH (ref 4.8–10.8)

## 2020-02-24 PROCEDURE — 99233 SBSQ HOSP IP/OBS HIGH 50: CPT

## 2020-02-24 RX ORDER — HYDRALAZINE HCL 50 MG
1 TABLET ORAL
Qty: 0 | Refills: 0 | DISCHARGE
Start: 2020-02-24

## 2020-02-24 RX ORDER — METFORMIN HYDROCHLORIDE 850 MG/1
1 TABLET ORAL
Qty: 60 | Refills: 0
Start: 2020-02-24 | End: 2020-03-24

## 2020-02-24 RX ORDER — CEFPODOXIME PROXETIL 100 MG
200 TABLET ORAL EVERY 12 HOURS
Refills: 0 | Status: DISCONTINUED | OUTPATIENT
Start: 2020-02-24 | End: 2020-02-25

## 2020-02-24 RX ORDER — HYDRALAZINE HCL 50 MG
1 TABLET ORAL
Qty: 90 | Refills: 0
Start: 2020-02-24 | End: 2020-03-24

## 2020-02-24 RX ORDER — AMLODIPINE BESYLATE 2.5 MG/1
1 TABLET ORAL
Qty: 30 | Refills: 0
Start: 2020-02-24 | End: 2020-03-24

## 2020-02-24 RX ORDER — TAMSULOSIN HYDROCHLORIDE 0.4 MG/1
1 CAPSULE ORAL
Qty: 30 | Refills: 0
Start: 2020-02-24 | End: 2020-03-24

## 2020-02-24 RX ORDER — CEFPODOXIME PROXETIL 100 MG
1 TABLET ORAL
Qty: 6 | Refills: 0
Start: 2020-02-24 | End: 2020-02-26

## 2020-02-24 RX ADMIN — Medication 50 MILLIGRAM(S): at 21:46

## 2020-02-24 RX ADMIN — CHLORHEXIDINE GLUCONATE 1 APPLICATION(S): 213 SOLUTION TOPICAL at 05:01

## 2020-02-24 RX ADMIN — ONDANSETRON 4 MILLIGRAM(S): 8 TABLET, FILM COATED ORAL at 11:43

## 2020-02-24 RX ADMIN — METFORMIN HYDROCHLORIDE 500 MILLIGRAM(S): 850 TABLET ORAL at 17:31

## 2020-02-24 RX ADMIN — SODIUM CHLORIDE 75 MILLILITER(S): 9 INJECTION INTRAMUSCULAR; INTRAVENOUS; SUBCUTANEOUS at 00:00

## 2020-02-24 RX ADMIN — SODIUM CHLORIDE 75 MILLILITER(S): 9 INJECTION INTRAMUSCULAR; INTRAVENOUS; SUBCUTANEOUS at 14:40

## 2020-02-24 RX ADMIN — Medication 200 MILLIGRAM(S): at 17:31

## 2020-02-24 RX ADMIN — CEFTRIAXONE 100 MILLIGRAM(S): 500 INJECTION, POWDER, FOR SOLUTION INTRAMUSCULAR; INTRAVENOUS at 09:25

## 2020-02-24 RX ADMIN — Medication 1: at 07:48

## 2020-02-24 RX ADMIN — HEPARIN SODIUM 5000 UNIT(S): 5000 INJECTION INTRAVENOUS; SUBCUTANEOUS at 05:00

## 2020-02-24 RX ADMIN — SODIUM CHLORIDE 75 MILLILITER(S): 9 INJECTION INTRAMUSCULAR; INTRAVENOUS; SUBCUTANEOUS at 05:00

## 2020-02-24 RX ADMIN — HEPARIN SODIUM 5000 UNIT(S): 5000 INJECTION INTRAVENOUS; SUBCUTANEOUS at 14:35

## 2020-02-24 RX ADMIN — Medication 50 MILLIGRAM(S): at 05:00

## 2020-02-24 RX ADMIN — HEPARIN SODIUM 5000 UNIT(S): 5000 INJECTION INTRAVENOUS; SUBCUTANEOUS at 21:46

## 2020-02-24 RX ADMIN — TAMSULOSIN HYDROCHLORIDE 0.4 MILLIGRAM(S): 0.4 CAPSULE ORAL at 21:46

## 2020-02-24 RX ADMIN — METFORMIN HYDROCHLORIDE 500 MILLIGRAM(S): 850 TABLET ORAL at 07:49

## 2020-02-24 RX ADMIN — Medication 2: at 11:43

## 2020-02-24 RX ADMIN — Medication 1: at 16:48

## 2020-02-24 RX ADMIN — AMLODIPINE BESYLATE 5 MILLIGRAM(S): 2.5 TABLET ORAL at 09:25

## 2020-02-24 NOTE — PROGRESS NOTE ADULT - PROBLEM SELECTOR PLAN 2
Keep indwelling yoo catheter.  Will need Urodynamics and cystoscopy as outpatient.
improved with catheterization
Patient now has an indwelling catheter  Start tamsulosin  patient had silent azotemia and warrants workup including cystoscopy and Urodynamics as outpatient.

## 2020-02-24 NOTE — PROGRESS NOTE ADULT - PROBLEM SELECTOR PLAN 1
Monitor T.  If patient develops another fever, may need stent placement.  Patient however continues to be pain free
will need urodynamics and cystoscopy as outpatient.
Resolving now due to catheterization.

## 2020-02-24 NOTE — DISCHARGE NOTE PROVIDER - NSDCCPCAREPLAN_GEN_ALL_CORE_FT
PRINCIPAL DISCHARGE DIAGNOSIS  Diagnosis: Acute renal failure  Assessment and Plan of Treatment: resolved      SECONDARY DISCHARGE DIAGNOSES  Diagnosis: Urinary retention  Assessment and Plan of Treatment: continue with yoo catheter and follow up with Urology

## 2020-02-24 NOTE — PROGRESS NOTE ADULT - SUBJECTIVE AND OBJECTIVE BOX
NOEMÍ REYES  74y  Male      Patient is a 74y old  Male who presents with a chief complaint of DAISY, urinary retention, kidney stone (2020 13:23)      INTERVAL HPI/OVERNIGHT EVENTS:  pt seen and examined at bedside   -fevers resolving; work up negative so far; follow up cultures  -ambulate the patient  -hba1c consistent with DM II; started on metformin with finger stick monitoring   -pt is very non-compliant with medical care and does not follow up with any doctors ---- now with Hypertensive med + diabetic meds   -DNR/DNI     REVIEW OF SYSTEMS:  comfortable sitting up in chair     Vital Signs Last 24 Hrs  T(C): 37.2 (2020 09:36), Max: 37.4 (2020 05:10)  T(F): 98.9 (2020 09:36), Max: 99.3 (2020 05:10)  HR: 125 (2020 09:36) (111 - 126)  BP: 152/70 (2020 09:36) (116/76 - 167/82)  RR: 16 (2020 09:36) (16 - 18)    PHYSICAL EXAM:  GENERAL: NAD, speaking appropriately  HEAD:  Atraumatic, Normocephalic  EYES: EOMI, PERRLA, conjunctiva and sclera clear  NERVOUS SYSTEM:  Alert & Oriented X 4  CHEST/LUNG: Clear to percussion bilaterally; No rales, rhonchi, wheezing, or rubs  CV/HEART: Regular rate and rhythm; No murmurs, rubs, or gallops  GI/ABDOMEN: Soft, Nontender, Nondistended; Bowel sounds present; yoo present   EXTREMITIES:  2+ Peripheral Pulses, No clubbing, cyanosis, or edema  SKIN: No rashes or lesions    LAB:                        12.6   12.87 )-----------( 209      ( 2020 04:30 )             37.5   02-24    140  |  105  |  18  ----------------------------<  218<H>  4.0   |  21  |  1.2    Ca    8.0<L>      2020 04:30      Culture - Urine (collected 2020 11:21)  Source: .Urine Catheterized  Preliminary Report (2020 21:58):    10,000 - 49,000 CFU/mL Gram positive organisms    TPro  6.5  /  Alb  4.0  /  TBili  1.2  /  DBili  x   /  AST  42<H>  /  ALT  22  /  AlkPhos  97  02-20    Daily     Daily Weight in k.8 (2020 14:22)  CAPILLARY BLOOD GLUCOSE      POCT Blood Glucose.: 157 mg/dL (2020 07:31)  POCT Blood Glucose.: 185 mg/dL (2020 20:59)  POCT Blood Glucose.: 190 mg/dL (2020 16:33)    Urinalysis Basic - ( 2020 07:20 )    Color: Yellow / Appearance: Clear / S.010 / pH: x  Gluc: x / Ketone: Negative  / Bili: Negative / Urobili: 0.2 mg/dL   Blood: x / Protein: Negative mg/dL / Nitrite: Negative   Leuk Esterase: Negative / RBC: x / WBC 3-5 /HPF   Sq Epi: x / Non Sq Epi: Occasional /HPF / Bacteria: Few    LIVER FUNCTIONS - ( 2020 07:20 )  Alb: 4.0 g/dL / Pro: 6.5 g/dL / ALK PHOS: 97 U/L / ALT: 22 U/L / AST: 42 U/L / GGT: x           RADIOLOGY:    Imaging Personally Reviewed:  [ Y ] YES  [ ] NO    HEALTH ISSUES - PROBLEM Dx:  HTN (hypertension): HTN (hypertension)  Inguinal hernia: Inguinal hernia  Peripheral neuropathy: Peripheral neuropathy  Urinary retention: Urinary retention  Acute renal failure: Acute renal failure  Kidney stone: Kidney stone      MEDICATIONS  (STANDING):  amLODIPine   Tablet 5 milliGRAM(s) Oral every 24 hours  cefTRIAXone   IVPB 1000 milliGRAM(s) IV Intermittent every 24 hours  chlorhexidine 4% Liquid 1 Application(s) Topical <User Schedule>  dextrose 5%. 1000 milliLiter(s) (50 mL/Hr) IV Continuous <Continuous>  dextrose 50% Injectable 12.5 Gram(s) IV Push once  dextrose 50% Injectable 25 Gram(s) IV Push once  dextrose 50% Injectable 25 Gram(s) IV Push once  heparin  Injectable 5000 Unit(s) SubCutaneous every 8 hours  hydrALAZINE 50 milliGRAM(s) Oral every 8 hours  hydrocortisone hemorrhoidal Suppository 1 Suppository(s) Rectal daily  influenza   Vaccine 0.5 milliLiter(s) IntraMuscular once  insulin lispro (HumaLOG) corrective regimen sliding scale   SubCutaneous three times a day before meals  metFORMIN 500 milliGRAM(s) Oral two times a day with meals  sodium chloride 0.9%. 1000 milliLiter(s) (75 mL/Hr) IV Continuous <Continuous>  tamsulosin 0.4 milliGRAM(s) Oral at bedtime    MEDICATIONS  (PRN):  acetaminophen   Tablet .. 650 milliGRAM(s) Oral every 6 hours PRN Temp greater or equal to 38C (100.4F), Mild Pain (1 - 3)  dextrose 40% Gel 15 Gram(s) Oral once PRN Blood Glucose LESS THAN 70 milliGRAM(s)/deciliter  glucagon  Injectable 1 milliGRAM(s) IntraMuscular once PRN Glucose LESS THAN 70 milligrams/deciliter  hydrALAZINE 25 milliGRAM(s) Oral every 8 hours PRN Systolic blood pressure >  ondansetron Injectable 4 milliGRAM(s) IV Push every 8 hours PRN Nausea and/or Vomiting  sodium chloride 0.65% Nasal 1 Spray(s) Both Nostrils two times a day PRN Nasal Congestion

## 2020-02-24 NOTE — PROGRESS NOTE ADULT - ASSESSMENT
A 74 male with pmhx of HTN not on medication currently, ichthyosis, peripheral neuropathy, L hydrocele s/p hydrocelectomy 50 years ago, Right inguinal hernia for 30 years  presents to ed with urinary retention. Pt reports for past 2 days has been having hard time urinating. Pt reports tries to go to the bathroom but can not urinate a lot , has hard time starting to urinate. Pt denies hematuria. Pt reports yesterday started having burning with urination. Pt has not seen a doctor in 10 years. PT denies taking any medications. Pt denies fever, chills, chest pain, shortness of breath,  back pain, nausea, vomiting.         1) DAISY --- post renal/obstructive uropathy/Urinary retention/hydronephrosis   -serum Cr improved to 1.2 from 4.9   -Yoo renewed --- one liter output in ED -- will go home with yoo as per    - follow up noted   -Nephrology noted     2) Fevers/Leukocytosis --- r/o UTI  -chest xray -ve   -U/a no significant findings;   -urine cultures growing Gram positive organisms   -continue with current abx     3) HTN  -not on meds at home  -monitor bp; added hydralazine standing dose + norvasc   -stable bp on current regimen     4) DM II (new onset)  -hba1c ~ 7.8  -started on metformin  -dietary eval; outpatient close PMD monitoring    5) Non-compliance with medical care/follow ups    6) dvt and gi ppx     DNR/DNI      # Progress Note Handoff  PENDING as follows  consults: Nephrology,  follow up noted   Test: blood cultures Pending   Family discussion: discussed with patient who comprehends his medical care and agreeable for inpatient care; updates given to sister at bedside over the weekend   Disposition: home once clinically stable --- monitor fevers     Attending Physician Dr. Avril Landry # [id number].

## 2020-02-24 NOTE — PROGRESS NOTE ADULT - PROBLEM SELECTOR PLAN 3
Patient asymptomatic.  Continue conservative management
Patient asymptomatic and renal function improving.  Will treat conservatively with IV hydration.  may need ureteroscopy in future.

## 2020-02-24 NOTE — DISCHARGE NOTE PROVIDER - CARE PROVIDER_API CALL
Dr. Viveros on 2/27/20 at 1:15pm at [address], [place],   Phone: (   )    -  Fax: (   )    -  Follow Up Time: Dr. Viveros on 2/27/20 at 1:15pm at [address] [facility],   Phone: (   )    -  Fax: (   )    -  Follow Up Time:     Darinel Allen)  Urology  [address] [place] [address]  Phone: [phone]  Fax: [phone]  Follow Up Time:

## 2020-02-24 NOTE — DISCHARGE NOTE PROVIDER - HOSPITAL COURSE
ED presentation:     A 74 male with pmhx of HTN not on medication currently, ichthyosis, peripheral neuropathy, L hydrocele s/p hydrocelectomy 50 years ago, Right inguinal hernia for 30 years  presents to ed with urinary retention. Pt reports for past 2 days has been having hard time urinating. Pt reports tries to go to the bathroom but can not urinate a lot , has hard time starting to urinate. Pt denies hematuria. Pt reports yesterday started having burning with urination. Pt has not seen a doctor in 10 years. PT denies taking any medications. Pt denies fever, chills, chest pain, shortness of breath,  back pain, nausea, vomiting.         pt admitted to medical floor with DAISY; post-renal; improved kidney function; also found to have HTN (likely undiagnosed as pt has not seen or visited doctors in years) and newly diagnosed     DM II; now on medication for diabetes and High blood pressure. Also found to be in Acute urinary retention and will go home with     yoo catheter with outpatient follow up with Urology. pt with uncomplicated UTI; stable on oral antibiotics.  Pt ambulated with PT; did well this hospital stay         -pt seen and examined on the day of discharge and is stable for home d/c     -home care arranged     -Dr. Viveros's appt for 2/27/20 at 1:15 pm scheduled     -outpatient follow up with Dr. Hernandez [facility]         Spent over 35 mins d/c planning ED presentation:     A 74 male with pmhx of HTN not on medication currently, ichthyosis, peripheral neuropathy, L hydrocele s/p hydrocelectomy 50 years ago, Right inguinal hernia for 30 years  presents to ed with urinary retention. Pt reports for past 2 days has been having hard time urinating. Pt reports tries to go to the bathroom but can not urinate a lot , has hard time starting to urinate. Pt denies hematuria. Pt reports yesterday started having burning with urination. Pt has not seen a doctor in 10 years. PT denies taking any medications. Pt denies fever, chills, chest pain, shortness of breath,  back pain, nausea, vomiting.         pt admitted to medical floor with DAISY; post-renal; improved kidney function; also found to have HTN (likely undiagnosed as pt has not seen or visited doctors in years) and newly diagnosed     DM II; now on medication for diabetes and High blood pressure. Also found to be in Acute urinary retention and will go home with     yoo catheter with outpatient follow up with Urology. pt with uncomplicated UTI; stable on oral antibiotics.  Pt ambulated with PT; did well this hospital stay         -pt seen and examined on the day of discharge and is stable for home d/c     -home care arranged     -Dr. Viveros's appt for 2/27/20 at 1:15 pm scheduled     -outpatient follow up with Dr. Hernandez [facility]     -pt needs blood pressure and finger stick monitoring at home         Spent over 35 mins d/c planning ED presentation:     A 74 male with pmhx of HTN not on medication currently, ichthyosis, peripheral neuropathy, L hydrocele s/p hydrocelectomy 50 years ago, Right inguinal hernia for 30 years  presents to ed with urinary retention. Pt reports for past 2 days has been having hard time urinating. Pt reports tries to go to the bathroom but can not urinate a lot , has hard time starting to urinate. Pt denies hematuria. Pt reports yesterday started having burning with urination. Pt has not seen a doctor in 10 years. PT denies taking any medications. Pt denies fever, chills, chest pain, shortness of breath,  back pain, nausea, vomiting.         pt admitted to medical floor with DAISY; post-renal; improved kidney function; also found to have HTN (likely undiagnosed as pt has not seen or visited doctors in years) and newly diagnosed     DM II; now on medication for diabetes and High blood pressure. Also found to be in Acute urinary retention and will go home with     yoo catheter with outpatient follow up with Urology. pt with uncomplicated UTI; stable on oral antibiotics.  Pt ambulated with PT; did well this hospital stay         -pt seen and examined on the day of discharge and is stable for home d/c     -home care arranged     -Dr. Viveros's appt for 2/27/20 at 1:15 pm scheduled     -outpatient follow up with Dr. Hernandez [facility]     -pt needs blood pressure and finger stick monitoring at home     -reported constipation; miralax given and had BM (pt requested to be discharged asap)        Spent over 35 mins d/c planning

## 2020-02-24 NOTE — DISCHARGE NOTE PROVIDER - PROVIDER TOKENS
FREE:[LAST:[Dr. Viveros on 2/27/20 at 1:15pm at [address] [place] [address], [facility]],PHONE:[(   )    -],FAX:[(   )    -]] FREE:[LAST:[Dr. Viveros on 2/27/20 at 1:15pm at [address] [place] [address]],PHONE:[(   )    -],FAX:[(   )    -]],PROVIDER:[TOKEN:[[id number]]]

## 2020-02-24 NOTE — DISCHARGE NOTE PROVIDER - NSDCMRMEDTOKEN_GEN_ALL_CORE_FT
amLODIPine 5 mg oral tablet: 1 tab(s) orally every 24 hours  cefpodoxime 200 mg oral tablet: 1 tab(s) orally every 12 hours for 3 more days   hydrALAZINE 25 mg oral tablet: 1 tab(s) orally 3 times a day; monitor your blood pressure at home   metFORMIN 500 mg oral tablet: 1 tab(s) orally 2 times a day (with meals)  tamsulosin 0.4 mg oral capsule: 1 cap(s) orally once a day (at bedtime)

## 2020-02-25 ENCOUNTER — TRANSCRIPTION ENCOUNTER (OUTPATIENT)
Age: 75
End: 2020-02-25

## 2020-02-25 VITALS
DIASTOLIC BLOOD PRESSURE: 64 MMHG | HEART RATE: 99 BPM | SYSTOLIC BLOOD PRESSURE: 128 MMHG | RESPIRATION RATE: 16 BRPM | TEMPERATURE: 97 F

## 2020-02-25 PROBLEM — Z00.00 ENCOUNTER FOR PREVENTIVE HEALTH EXAMINATION: Status: ACTIVE | Noted: 2020-02-25

## 2020-02-25 LAB
GLUCOSE BLDC GLUCOMTR-MCNC: 147 MG/DL — HIGH (ref 70–99)
GLUCOSE BLDC GLUCOMTR-MCNC: 179 MG/DL — HIGH (ref 70–99)
HCT VFR BLD CALC: 38.9 % — LOW (ref 42–52)
HGB BLD-MCNC: 12.6 G/DL — LOW (ref 14–18)
MCHC RBC-ENTMCNC: 29.2 PG — SIGNIFICANT CHANGE UP (ref 27–31)
MCHC RBC-ENTMCNC: 32.4 G/DL — SIGNIFICANT CHANGE UP (ref 32–37)
MCV RBC AUTO: 90.3 FL — SIGNIFICANT CHANGE UP (ref 80–94)
NRBC # BLD: 0 /100 WBCS — SIGNIFICANT CHANGE UP (ref 0–0)
PLATELET # BLD AUTO: 247 K/UL — SIGNIFICANT CHANGE UP (ref 130–400)
RBC # BLD: 4.31 M/UL — LOW (ref 4.7–6.1)
RBC # FLD: 12 % — SIGNIFICANT CHANGE UP (ref 11.5–14.5)
WBC # BLD: 12.27 K/UL — HIGH (ref 4.8–10.8)
WBC # FLD AUTO: 12.27 K/UL — HIGH (ref 4.8–10.8)

## 2020-02-25 PROCEDURE — 99239 HOSP IP/OBS DSCHRG MGMT >30: CPT

## 2020-02-25 RX ORDER — POLYETHYLENE GLYCOL 3350 17 G/17G
17 POWDER, FOR SOLUTION ORAL ONCE
Refills: 0 | Status: COMPLETED | OUTPATIENT
Start: 2020-02-25 | End: 2020-02-25

## 2020-02-25 RX ADMIN — METFORMIN HYDROCHLORIDE 500 MILLIGRAM(S): 850 TABLET ORAL at 08:27

## 2020-02-25 RX ADMIN — POLYETHYLENE GLYCOL 3350 17 GRAM(S): 17 POWDER, FOR SOLUTION ORAL at 11:09

## 2020-02-25 RX ADMIN — Medication 1: at 11:09

## 2020-02-25 RX ADMIN — INFLUENZA VIRUS VACCINE 0.5 MILLILITER(S): 15; 15; 15; 15 SUSPENSION INTRAMUSCULAR at 12:22

## 2020-02-25 RX ADMIN — AMLODIPINE BESYLATE 5 MILLIGRAM(S): 2.5 TABLET ORAL at 08:26

## 2020-02-25 RX ADMIN — HEPARIN SODIUM 5000 UNIT(S): 5000 INJECTION INTRAVENOUS; SUBCUTANEOUS at 05:40

## 2020-02-25 RX ADMIN — Medication 50 MILLIGRAM(S): at 05:40

## 2020-02-25 RX ADMIN — Medication 200 MILLIGRAM(S): at 05:40

## 2020-02-25 RX ADMIN — SODIUM CHLORIDE 75 MILLILITER(S): 9 INJECTION INTRAMUSCULAR; INTRAVENOUS; SUBCUTANEOUS at 05:41

## 2020-02-25 NOTE — PROGRESS NOTE ADULT - SUBJECTIVE AND OBJECTIVE BOX
NOEMÍ REYES  74y  Male      Patient is a 74y old  Male who presents with a chief complaint of DAISY, urinary retention, kidney stone (2020 13:23)      INTERVAL HPI/OVERNIGHT EVENTS:  pt seen and examined at bedside   -no high fevers; mild temp 99  -ambulate the patient  -hba1c consistent with DM II; started on metformin this admission, with finger stick monitoring   -pt is very non-compliant with medical care and does not follow up with any doctors ---- now with Hypertensive med + diabetic meds   -Dr. Viveros's appt made for him  -DNR/DNI     REVIEW OF SYSTEMS:  comfortable sitting up in chair     Vital Signs Last 24 Hrs  T(C): 36.2 (2020 05:00), Max: 37.2 (2020 09:36)  T(F): 97.2 (2020 05:00), Max: 99 (2020 14:56)  HR: 99 (2020 05:00) (99 - 125)  BP: 128/64 (2020 05:00) (127/58 - 165/78)  RR: 16 (2020 05:00) (16 - 16)      PHYSICAL EXAM:  GENERAL: NAD, speaking appropriately  HEAD:  Atraumatic, Normocephalic  EYES: EOMI, PERRLA, conjunctiva and sclera clear  NERVOUS SYSTEM:  Alert & Oriented X 4  CHEST/LUNG: Clear to percussion bilaterally; No rales, rhonchi, wheezing, or rubs  CV/HEART: Regular rate and rhythm; No murmurs, rubs, or gallops  GI/ABDOMEN: Soft, Nontender, Nondistended; Bowel sounds present; yoo present   EXTREMITIES:  2+ Peripheral Pulses, No clubbing, cyanosis, or edema  SKIN: No rashes or lesions    LAB:             12.6   12.87 )-----------( 209      ( 2020 04:30 )             37.5   02-24    140  |  105  |  18  ----------------------------<  218<H>  4.0   |  21  |  1.2    Ca    8.0<L>      2020 04:30      Culture - Urine (collected 2020 11:21)  Source: .Urine Catheterized  Preliminary Report (2020 21:58):    10,000 - 49,000 CFU/mL Gram positive organisms    TPro  6.5  /  Alb  4.0  /  TBili  1.2  /  DBili  x   /  AST  42<H>  /  ALT  22  /  AlkPhos  97  02-20    Daily     Daily Weight in k.8 (2020 14:22)  CAPILLARY BLOOD GLUCOSE      POCT Blood Glucose.: 157 mg/dL (2020 07:31)  POCT Blood Glucose.: 185 mg/dL (2020 20:59)  POCT Blood Glucose.: 190 mg/dL (2020 16:33)    Urinalysis Basic - ( 2020 07:20 )    Color: Yellow / Appearance: Clear / S.010 / pH: x  Gluc: x / Ketone: Negative  / Bili: Negative / Urobili: 0.2 mg/dL   Blood: x / Protein: Negative mg/dL / Nitrite: Negative   Leuk Esterase: Negative / RBC: x / WBC 3-5 /HPF   Sq Epi: x / Non Sq Epi: Occasional /HPF / Bacteria: Few    LIVER FUNCTIONS - ( 2020 07:20 )  Alb: 4.0 g/dL / Pro: 6.5 g/dL / ALK PHOS: 97 U/L / ALT: 22 U/L / AST: 42 U/L / GGT: x           RADIOLOGY:    Imaging Personally Reviewed:  [ Y ] YES  [ ] NO    HEALTH ISSUES - PROBLEM Dx:  HTN (hypertension): HTN (hypertension)  Inguinal hernia: Inguinal hernia  Peripheral neuropathy: Peripheral neuropathy  Urinary retention: Urinary retention  Acute renal failure: Acute renal failure  Kidney stone: Kidney stone      MEDICATIONS  (STANDING):  amLODIPine   Tablet 5 milliGRAM(s) Oral every 24 hours  cefpodoxime 200 milliGRAM(s) Oral every 12 hours  chlorhexidine 4% Liquid 1 Application(s) Topical <User Schedule>  dextrose 5%. 1000 milliLiter(s) (50 mL/Hr) IV Continuous <Continuous>  dextrose 50% Injectable 12.5 Gram(s) IV Push once  dextrose 50% Injectable 25 Gram(s) IV Push once  dextrose 50% Injectable 25 Gram(s) IV Push once  heparin  Injectable 5000 Unit(s) SubCutaneous every 8 hours  hydrALAZINE 50 milliGRAM(s) Oral every 8 hours  hydrocortisone hemorrhoidal Suppository 1 Suppository(s) Rectal daily  influenza   Vaccine 0.5 milliLiter(s) IntraMuscular once  insulin lispro (HumaLOG) corrective regimen sliding scale   SubCutaneous three times a day before meals  metFORMIN 500 milliGRAM(s) Oral two times a day with meals  sodium chloride 0.9%. 1000 milliLiter(s) (75 mL/Hr) IV Continuous <Continuous>  tamsulosin 0.4 milliGRAM(s) Oral at bedtime    MEDICATIONS  (PRN):  acetaminophen   Tablet .. 650 milliGRAM(s) Oral every 6 hours PRN Temp greater or equal to 38C (100.4F), Mild Pain (1 - 3)  dextrose 40% Gel 15 Gram(s) Oral once PRN Blood Glucose LESS THAN 70 milliGRAM(s)/deciliter  glucagon  Injectable 1 milliGRAM(s) IntraMuscular once PRN Glucose LESS THAN 70 milligrams/deciliter  hydrALAZINE 25 milliGRAM(s) Oral every 8 hours PRN Systolic blood pressure >  ondansetron Injectable 4 milliGRAM(s) IV Push every 8 hours PRN Nausea and/or Vomiting  sodium chloride 0.65% Nasal 1 Spray(s) Both Nostrils two times a day PRN Nasal Congestion

## 2020-02-25 NOTE — DIETITIAN INITIAL EVALUATION ADULT. - OTHER INFO
Pt was admitted for a chief complaint of DAISY and urinary retention. Pt is not taking any medications and is non-compliant with medical care/follow ups (has not seen a doctor in 10 years).  DAISY due to bladder outlet obstruction, BPH, Lt ureteral calculus - Nephrology following. On abx for UTI.  New onset DM II, HbA1C- 7.8, started on metformin. D/c planning home with home care. Pt was admitted for a chief complaint of DAISY and urinary retention. Pt is not taking any medications and is non-compliant with medical care/follow ups (has not seen a doctor in 10 years).  DAISY due to bladder outlet obstruction, BPH, Lt ureteral calculus - Nephrology following. On abx for UTI. New onset DM II, HbA1C- 7.8, started on metformin.     RD spoke w/ patient and his siblings (at bedside), pt is being discharged right at this moment. RD briefly discussed DM II including nutrition therapy and label reading. Provided referral to Diabetes self management program.

## 2020-02-25 NOTE — PROGRESS NOTE ADULT - REASON FOR ADMISSION
DAISY, urinary retention, kidney stone

## 2020-02-25 NOTE — DISCHARGE NOTE NURSING/CASE MANAGEMENT/SOCIAL WORK - PATIENT PORTAL LINK FT
You can access the FollowMyHealth Patient Portal offered by [facility] by registering at the following website: [email or web address]. By joining [employer]’s FollowMyHealth portal, you will also be able to view your health information using other applications (apps) compatible with our system.

## 2020-02-25 NOTE — PROGRESS NOTE ADULT - ASSESSMENT
A 74 male with pmhx of HTN not on medication currently, ichthyosis, peripheral neuropathy, L hydrocele s/p hydrocelectomy 50 years ago, Right inguinal hernia for 30 years  presents to ed with urinary retention. Pt reports for past 2 days has been having hard time urinating. Pt reports tries to go to the bathroom but can not urinate a lot , has hard time starting to urinate. Pt denies hematuria. Pt reports yesterday started having burning with urination. Pt has not seen a doctor in 10 years. PT denies taking any medications. Pt denies fever, chills, chest pain, shortness of breath,  back pain, nausea, vomiting.         1) DAISY --- post renal/obstructive uropathy/Urinary retention/hydronephrosis   -serum Cr improved to 1.2 from 4.9   -Yoo renewed --- one liter output in ED -- will go home with yoo as per    - follow up noted   -Nephrology noted     2) uncomplicated UTI  -urine cultures growing Gram positive organisms   -stable on oral antibiotics     3) HTN  -not on meds at home  -monitor bp; added hydralazine standing dose + norvasc   -stable bp on current regimen     4) DM II (new onset)  -hba1c ~ 7.8  -started on metformin  -dietary eval; outpatient close PMD monitoring    5) Non-compliance with medical care/follow ups    6) dvt and gi ppx     DNR/DNI    # Progress Note Handoff  PENDING as follows  consults: none   Test: reviewed   Family discussion: discussed with patient who comprehends his medical care and agreeable for d/c planning home with home care   Disposition: home with home care today    Attending Physician Dr. Avril Landry # [id number].     d/c papers done by me

## 2020-02-27 ENCOUNTER — OUTPATIENT (OUTPATIENT)
Dept: OUTPATIENT SERVICES | Facility: HOSPITAL | Age: 75
LOS: 1 days | Discharge: HOME | End: 2020-02-27

## 2020-02-27 ENCOUNTER — APPOINTMENT (OUTPATIENT)
Dept: GERIATRICS | Facility: CLINIC | Age: 75
End: 2020-02-27

## 2020-02-27 VITALS
BODY MASS INDEX: 23.78 KG/M2 | HEIGHT: 66 IN | SYSTOLIC BLOOD PRESSURE: 170 MMHG | HEART RATE: 142 BPM | TEMPERATURE: 98.2 F | WEIGHT: 148 LBS | DIASTOLIC BLOOD PRESSURE: 98 MMHG

## 2020-02-27 DIAGNOSIS — N43.3 HYDROCELE, UNSPECIFIED: Chronic | ICD-10-CM

## 2020-02-28 DIAGNOSIS — N39.0 URINARY TRACT INFECTION, SITE NOT SPECIFIED: ICD-10-CM

## 2020-02-28 DIAGNOSIS — N17.9 ACUTE KIDNEY FAILURE, UNSPECIFIED: ICD-10-CM

## 2020-02-28 DIAGNOSIS — R33.9 RETENTION OF URINE, UNSPECIFIED: ICD-10-CM

## 2020-02-28 DIAGNOSIS — Z91.19 PATIENT'S NONCOMPLIANCE WITH OTHER MEDICAL TREATMENT AND REGIMEN: ICD-10-CM

## 2020-02-28 DIAGNOSIS — K40.90 UNILATERAL INGUINAL HERNIA, WITHOUT OBSTRUCTION OR GANGRENE, NOT SPECIFIED AS RECURRENT: ICD-10-CM

## 2020-02-28 DIAGNOSIS — Z66 DO NOT RESUSCITATE: ICD-10-CM

## 2020-02-28 DIAGNOSIS — I10 ESSENTIAL (PRIMARY) HYPERTENSION: ICD-10-CM

## 2020-02-28 DIAGNOSIS — N32.0 BLADDER-NECK OBSTRUCTION: ICD-10-CM

## 2020-02-28 DIAGNOSIS — N13.2 HYDRONEPHROSIS WITH RENAL AND URETERAL CALCULOUS OBSTRUCTION: ICD-10-CM

## 2020-02-28 DIAGNOSIS — K59.00 CONSTIPATION, UNSPECIFIED: ICD-10-CM

## 2020-02-28 DIAGNOSIS — N40.0 BENIGN PROSTATIC HYPERPLASIA WITHOUT LOWER URINARY TRACT SYMPTOMS: ICD-10-CM

## 2020-02-28 DIAGNOSIS — E11.40 TYPE 2 DIABETES MELLITUS WITH DIABETIC NEUROPATHY, UNSPECIFIED: ICD-10-CM

## 2020-03-04 DIAGNOSIS — Z02.9 ENCOUNTER FOR ADMINISTRATIVE EXAMINATIONS, UNSPECIFIED: ICD-10-CM

## 2020-11-14 ENCOUNTER — EMERGENCY (EMERGENCY)
Facility: HOSPITAL | Age: 75
LOS: 0 days | Discharge: HOME | End: 2020-11-14
Attending: EMERGENCY MEDICINE | Admitting: EMERGENCY MEDICINE
Payer: MEDICARE

## 2020-11-14 VITALS
DIASTOLIC BLOOD PRESSURE: 105 MMHG | HEART RATE: 150 BPM | HEIGHT: 66 IN | WEIGHT: 149.91 LBS | TEMPERATURE: 99 F | SYSTOLIC BLOOD PRESSURE: 182 MMHG | OXYGEN SATURATION: 95 % | RESPIRATION RATE: 20 BRPM

## 2020-11-14 VITALS — HEART RATE: 96 BPM

## 2020-11-14 DIAGNOSIS — N43.3 HYDROCELE, UNSPECIFIED: Chronic | ICD-10-CM
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
## 2020-11-14 DIAGNOSIS — T83.091A OTHER MECHANICAL COMPLICATION OF INDWELLING URETHRAL CATHETER, INITIAL ENCOUNTER: ICD-10-CM
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
## 2020-11-14 DIAGNOSIS — R00.0 TACHYCARDIA, UNSPECIFIED: ICD-10-CM

## 2020-11-14 DIAGNOSIS — T83.098A OTHER MECHANICAL COMPLICATION OF OTHER URINARY CATHETER, INITIAL ENCOUNTER: ICD-10-CM

## 2020-11-14 LAB
ALBUMIN SERPL ELPH-MCNC: 4 G/DL — SIGNIFICANT CHANGE UP (ref 3.5–5.2)
ALP SERPL-CCNC: 95 U/L — SIGNIFICANT CHANGE UP (ref 30–115)
ALT FLD-CCNC: 12 U/L — SIGNIFICANT CHANGE UP (ref 0–41)
ANION GAP SERPL CALC-SCNC: 9 MMOL/L — SIGNIFICANT CHANGE UP (ref 7–14)
APPEARANCE UR: ABNORMAL
AST SERPL-CCNC: 18 U/L — SIGNIFICANT CHANGE UP (ref 0–41)
BACTERIA # UR AUTO: ABNORMAL
BASOPHILS # BLD AUTO: 0.05 K/UL — SIGNIFICANT CHANGE UP (ref 0–0.2)
BASOPHILS NFR BLD AUTO: 0.6 % — SIGNIFICANT CHANGE UP (ref 0–1)
BILIRUB SERPL-MCNC: 1 MG/DL — SIGNIFICANT CHANGE UP (ref 0.2–1.2)
BILIRUB UR-MCNC: NEGATIVE — SIGNIFICANT CHANGE UP
BUN SERPL-MCNC: 14 MG/DL — SIGNIFICANT CHANGE UP (ref 10–20)
CALCIUM SERPL-MCNC: 8.4 MG/DL — LOW (ref 8.5–10.1)
CHLORIDE SERPL-SCNC: 99 MMOL/L — SIGNIFICANT CHANGE UP (ref 98–110)
CO2 SERPL-SCNC: 24 MMOL/L — SIGNIFICANT CHANGE UP (ref 17–32)
COLOR SPEC: YELLOW — SIGNIFICANT CHANGE UP
CREAT SERPL-MCNC: 1 MG/DL — SIGNIFICANT CHANGE UP (ref 0.7–1.5)
DIFF PNL FLD: ABNORMAL
EOSINOPHIL # BLD AUTO: 0.04 K/UL — SIGNIFICANT CHANGE UP (ref 0–0.7)
EOSINOPHIL NFR BLD AUTO: 0.5 % — SIGNIFICANT CHANGE UP (ref 0–8)
GLUCOSE SERPL-MCNC: 231 MG/DL — HIGH (ref 70–99)
GLUCOSE UR QL: NEGATIVE MG/DL — SIGNIFICANT CHANGE UP
HCT VFR BLD CALC: 38.1 % — LOW (ref 42–52)
HGB BLD-MCNC: 12.4 G/DL — LOW (ref 14–18)
IMM GRANULOCYTES NFR BLD AUTO: 0.5 % — HIGH (ref 0.1–0.3)
KETONES UR-MCNC: ABNORMAL
LEUKOCYTE ESTERASE UR-ACNC: ABNORMAL
LYMPHOCYTES # BLD AUTO: 0.75 K/UL — LOW (ref 1.2–3.4)
LYMPHOCYTES # BLD AUTO: 9.6 % — LOW (ref 20.5–51.1)
MCHC RBC-ENTMCNC: 28.8 PG — SIGNIFICANT CHANGE UP (ref 27–31)
MCHC RBC-ENTMCNC: 32.5 G/DL — SIGNIFICANT CHANGE UP (ref 32–37)
MCV RBC AUTO: 88.6 FL — SIGNIFICANT CHANGE UP (ref 80–94)
MONOCYTES # BLD AUTO: 0.5 K/UL — SIGNIFICANT CHANGE UP (ref 0.1–0.6)
MONOCYTES NFR BLD AUTO: 6.4 % — SIGNIFICANT CHANGE UP (ref 1.7–9.3)
NEUTROPHILS # BLD AUTO: 6.4 K/UL — SIGNIFICANT CHANGE UP (ref 1.4–6.5)
NEUTROPHILS NFR BLD AUTO: 82.4 % — HIGH (ref 42.2–75.2)
NITRITE UR-MCNC: NEGATIVE — SIGNIFICANT CHANGE UP
NRBC # BLD: 0 /100 WBCS — SIGNIFICANT CHANGE UP (ref 0–0)
PH UR: 5.5 — SIGNIFICANT CHANGE UP (ref 5–8)
PLATELET # BLD AUTO: 149 K/UL — SIGNIFICANT CHANGE UP (ref 130–400)
POTASSIUM SERPL-MCNC: 3.9 MMOL/L — SIGNIFICANT CHANGE UP (ref 3.5–5)
POTASSIUM SERPL-SCNC: 3.9 MMOL/L — SIGNIFICANT CHANGE UP (ref 3.5–5)
PROT SERPL-MCNC: 6.5 G/DL — SIGNIFICANT CHANGE UP (ref 6–8)
PROT UR-MCNC: 100 MG/DL
RBC # BLD: 4.3 M/UL — LOW (ref 4.7–6.1)
RBC # FLD: 13.2 % — SIGNIFICANT CHANGE UP (ref 11.5–14.5)
RBC CASTS # UR COMP ASSIST: ABNORMAL /HPF
SODIUM SERPL-SCNC: 132 MMOL/L — LOW (ref 135–146)
SP GR SPEC: 1.02 — SIGNIFICANT CHANGE UP (ref 1.01–1.03)
UROBILINOGEN FLD QL: 0.2 MG/DL — SIGNIFICANT CHANGE UP (ref 0.2–0.2)
WBC # BLD: 7.78 K/UL — SIGNIFICANT CHANGE UP (ref 4.8–10.8)
WBC # FLD AUTO: 7.78 K/UL — SIGNIFICANT CHANGE UP (ref 4.8–10.8)
WBC UR QL: >50 /HPF

## 2020-11-14 PROCEDURE — 99284 EMERGENCY DEPT VISIT MOD MDM: CPT | Mod: 25

## 2020-11-14 PROCEDURE — 51702 INSERT TEMP BLADDER CATH: CPT

## 2020-11-14 RX ORDER — SODIUM CHLORIDE 9 MG/ML
1000 INJECTION, SOLUTION INTRAVENOUS ONCE
Refills: 0 | Status: COMPLETED | OUTPATIENT
Start: 2020-11-14 | End: 2020-11-14

## 2020-11-14 RX ADMIN — SODIUM CHLORIDE 1000 MILLILITER(S): 9 INJECTION, SOLUTION INTRAVENOUS at 12:08

## 2020-11-14 NOTE — ED PROVIDER NOTE - PATIENT PORTAL LINK FT
You can access the FollowMyHealth Patient Portal offered by [facility] by registering at the following website: [email or web address]. By joining [employer]’s FollowMyHealth portal, you will also be able to view your health information using other applications (apps) compatible with our system.

## 2020-11-14 NOTE — ED ADULT NURSE NOTE - SUICIDE SCREENING QUESTION 3
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  Discussing ways to cope with the stress in her life and problem solve issues  Also discussing ongoing issues with her step sisters and ways to resolve them  Giving supportive therapy  A- progress Continuing to process her emotions  P- continue treatment       Pain Scale and Suicide Risk [facility]: Current Pain Assessment: no pain   On a scale of 0 to 10, the patient rates current pain at 0   Behavioral Health Treatment Plan [facility]: Diagnosis and Treatment Plan explained to patient, patient relates understanding diagnosis and is agreeable to Treatment Plan  Assessment    1   Adjustment disorder with depressed mood (309 0) (F43 21)    Signatures   Electronically signed by : Jairo Hilton LCSW; Feb 16 2017 10:22AM EST                       (Author)
Treatment Plan Tracking      #2 Treatment Plan not completed within required time limits due to: Client presented with emotional/behavioral issues that required clinical intervention          Signatures   Electronically signed by : Angus Cabrera LCSW; Feb 16 2017 10:22AM EST                       (Author)
No

## 2020-11-14 NOTE — ED ADULT TRIAGE NOTE - WEIGHT METHOD
CONCERNS/SYMPTOMS:  Monie was vomiting on Monday and Tuesday and threw up once on Wednesday, Now has diarrhea. Mom thinks that she probably has 6 stools per day. Non-bloody and soupy consistency. Mom denies fever, recent travel. I advised mom on normal course of diarrhea and what would warrant a visit per our RN protocol. Gave homecare advice per protocol and informed to call back with blood in stool, becomes worse, or if it persists over 2 weeks.   PROBLEM LIST CHECKED:  in chart only  ALLERGIES:  See [facility] charting  PROTOCOL USED:  Symptoms discussed and advice given per GUIDELINE-- Diarrhea , Telephone Care Office Protocols, CELE Kang, 15th edition, 2016  MEDICATIONS RECOMMENDED:  none  DISPOSITION:  Home care advice given per guideline   Patient/parent agrees with plan and expresses understanding.  Call back if symptoms are not improving or worse.  Staff name/title:  Deepti Ibrahim RN      
Reason for call:  Patient reporting a symptom    Symptom or request: patient is still not feeling better still has diarrhea since 11/20/14 mom would like a call back to talk about what could be done     Duration (how long have symptoms been present): 11/20/17    Have you been treated for this before? Yes    Phone Number patient can be reached at:  Home number on file [phone] (home)    Best Time:  raudel    Can we leave a detailed message on this number:  YES    Call taken on 11/24/2017 at 9:12 AM by Medina Rehman  
stated

## 2020-11-14 NOTE — ED PROVIDER NOTE - NS ED ROS FT
Constitutional: (-) fever, (-) chills  Eyes: (-) visual changes  ENT: (-) nasal congestions  Cardiovascular: (-) chest pain, (-) syncope  Respiratory: (-) cough, (-) shortness of breath, (-) dyspnea,   Gastrointestinal: (-) vomiting, (-) diarrhea, (-)nausea, (+) suprapubic pain  Musculoskeletal: (-) neck pain, (-) back pain, (-) joint pain,  Integumentary: (-) rash, (-) edema, (-) bruises  Neurological: (-) headache, (-) loc, (-) dizziness, (-) tingling, (-)numbness,  Peripheral Vascular: (-) leg swelling  :  (-)dysuria,  (-) hematuria  Allergic/Immunologic: (-) pruritus

## 2020-11-14 NOTE — ED PROVIDER NOTE - PHYSICAL EXAMINATION
Physical Exam    Vital Signs: I have reviewed the initial vital signs.  Constitutional: well-nourished, appears stated age, no acute distress  Eyes: Conjunctiva pink, Sclera clear,   Cardiovascular: S1 and S2, regular rate, regular rhythm, well-perfused extremities, radial pulses equal and 2+, pedal pulses 2+ and equal   Respiratory: unlabored respiratory effort, clear to auscultation bilaterally no wheezing, rales and rhonchi  Gastrointestinal: soft, non-tender abdomen, no pulsatile mass, normal bowl sounds, mild suprapubic ttp. yoo not draining.   Musculoskeletal: supple neck, no lower extremity edema, no midline tenderness  Integumentary: warm, dry, no rash  Neurologic: awake, alert, nvi

## 2020-11-14 NOTE — ED PROVIDER NOTE - CARE PLAN
Principal Discharge DX:	Julien catheter present   Principal Discharge DX:	Catheter (urine) change required

## 2020-11-14 NOTE — ED PROVIDER NOTE - OBJECTIVE STATEMENT
74 yo male, pmh of Common ichthyosis, dm, htn, prostatomegaly, recent chinmay 2/2 prostatomegaly, since feb 2020 yoo gets changed every 6 weeks, presents to ed for suprapubic pressure, mild, no specific radiation, started this morning, pt due for changed in four days with urologist, recently had normal lab work showed cr 1.1. Denies fever, chills, cp, sob, le swelling, abd pain, nvd.

## 2020-11-16 LAB
CULTURE RESULTS: SIGNIFICANT CHANGE UP
SPECIMEN SOURCE: SIGNIFICANT CHANGE UP

## 2021-02-25 NOTE — PATIENT PROFILE ADULT - ABILITY TO HEAR (WITH HEARING AID OR HEARING APPLIANCE IF NORMALLY USED):
Adequate: hears normal conversation without difficulty H/O  section    S/P appendectomy    S/P cholecystectomy    S/P foot surgery  in 2013.

## 2021-05-21 ENCOUNTER — EMERGENCY (EMERGENCY)
Facility: HOSPITAL | Age: 76
LOS: 0 days | Discharge: HOME | End: 2021-05-21
Attending: EMERGENCY MEDICINE | Admitting: EMERGENCY MEDICINE
Payer: MEDICARE

## 2021-05-21 VITALS
RESPIRATION RATE: 18 BRPM | HEART RATE: 98 BPM | SYSTOLIC BLOOD PRESSURE: 191 MMHG | DIASTOLIC BLOOD PRESSURE: 89 MMHG | OXYGEN SATURATION: 99 %

## 2021-05-21 VITALS
HEART RATE: 132 BPM | SYSTOLIC BLOOD PRESSURE: 239 MMHG | OXYGEN SATURATION: 99 % | DIASTOLIC BLOOD PRESSURE: 117 MMHG | TEMPERATURE: 96 F | WEIGHT: 145.06 LBS | HEIGHT: 66 IN | RESPIRATION RATE: 18 BRPM

## 2021-05-21 DIAGNOSIS — I10 ESSENTIAL (PRIMARY) HYPERTENSION: ICD-10-CM
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
## 2021-05-21 DIAGNOSIS — N40.0 BENIGN PROSTATIC HYPERPLASIA WITHOUT LOWER URINARY TRACT SYMPTOMS: ICD-10-CM
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
## 2021-05-21 DIAGNOSIS — T83.098A OTHER MECHANICAL COMPLICATION OF OTHER URINARY CATHETER, INITIAL ENCOUNTER: ICD-10-CM

## 2021-05-21 DIAGNOSIS — Z79.899 OTHER LONG TERM (CURRENT) DRUG THERAPY: ICD-10-CM

## 2021-05-21 DIAGNOSIS — N43.3 HYDROCELE, UNSPECIFIED: Chronic | ICD-10-CM

## 2021-05-21 PROCEDURE — 51702 INSERT TEMP BLADDER CATH: CPT

## 2021-05-21 PROCEDURE — 99283 EMERGENCY DEPT VISIT LOW MDM: CPT | Mod: 25

## 2021-05-21 NOTE — ED PROVIDER NOTE - PHYSICAL EXAMINATION
Physical Exam    Vital Signs: I have reviewed the initial vital signs.  Constitutional: well-nourished, appears stated age, no acute distress  Eyes: Conjunctiva pink, Sclera clear.  Cardiovascular: S1 and S2, regular rate, regular rhythm, well-perfused extremities, radial pulses equal and 2+  Respiratory: unlabored respiratory effort, clear to auscultation bilaterally no wheezing, rales and rhonchi  Gastrointestinal: soft, non-tender abdomen, suprapubic ttp, mild, no guarding, distended bladder, no pulsatile mass, normal bowl sounds  Musculoskeletal: supple neck, no lower extremity edema, no midline tenderness  Integumentary: warm, dry, no rash  Neurologic: awake, alert, nvi

## 2021-05-21 NOTE — ED PROVIDER NOTE - PATIENT PORTAL LINK FT
You can access the FollowMyHealth Patient Portal offered by [facility] by registering at the following website: [email or web address]. By joining [employer]’s FollowMyHealth portal, you will also be able to view your health information using other applications (apps) compatible with our system.

## 2021-05-21 NOTE — ED PROVIDER NOTE - CARE PROVIDER_API CALL
Mikey Donnelly)  Urology  [address] [place] [address], [address]  [place] [address]  Phone: [phone]  Fax: [phone]  Follow Up Time: 1-3 Days

## 2021-05-21 NOTE — ED PROVIDER NOTE - NS ED ROS FT
Constitutional: (-) fever, (-) chills  Eyes: (-) visual changes  ENT: (-) nasal congestions  Cardiovascular: (-) chest pain, (-) syncope  Respiratory: (-) cough, (-) shortness of breath, (-) dyspnea,   Gastrointestinal: (-) vomiting, (-) diarrhea, (-)nausea,  Musculoskeletal: (-) neck pain, (-) back pain, (-) joint pain,  Integumentary: (-) rash, (-) edema, (-) bruises  Neurological: (-) headache, (-) loc, (-) dizziness, (-) tingling, (-)numbness,  Peripheral Vascular: (-) leg swelling  :  (-)dysuria,  (-) hematuria, (+) retention   Allergic/Immunologic: (-) pruritus

## 2021-05-21 NOTE — ED PROVIDER NOTE - OBJECTIVE STATEMENT
76 yo male, pmh of bph, htn, presents to ed for yoo complication. states noted today stopped draining, now c/o suprapubic pain, mild, aching, no radiation. denies fever, chills, cp, sob, le swelling.

## 2021-07-21 NOTE — ED ADULT NURSE NOTE - CHIEF COMPLAINT QUOTE
Called patient.  Left message to call back.    [facility] nurse will see patient 11:00 am, will get update from them on 7/22      7/15: WEIGHT: 142#  B/p:  7/15: 112/62, 80p       pt complaining of chronic yoo having small amount urine draining blood tinged, denies fever

## 2021-09-02 ENCOUNTER — OUTPATIENT (OUTPATIENT)
Dept: OUTPATIENT SERVICES | Facility: HOSPITAL | Age: 76
LOS: 1 days | Discharge: HOME | End: 2021-09-02
Payer: MEDICARE

## 2021-09-02 VITALS
HEART RATE: 120 BPM | DIASTOLIC BLOOD PRESSURE: 88 MMHG | WEIGHT: 154.32 LBS | SYSTOLIC BLOOD PRESSURE: 190 MMHG | TEMPERATURE: 97 F | RESPIRATION RATE: 18 BRPM | OXYGEN SATURATION: 98 % | HEIGHT: 66 IN

## 2021-09-02 DIAGNOSIS — N43.3 HYDROCELE, UNSPECIFIED: Chronic | ICD-10-CM

## 2021-09-02 DIAGNOSIS — N40.1 BENIGN PROSTATIC HYPERPLASIA WITH LOWER URINARY TRACT SYMPTOMS: ICD-10-CM

## 2021-09-02 DIAGNOSIS — Z01.818 ENCOUNTER FOR OTHER PREPROCEDURAL EXAMINATION: ICD-10-CM

## 2021-09-02 LAB
A1C WITH ESTIMATED AVERAGE GLUCOSE RESULT: 5.1 % — SIGNIFICANT CHANGE UP (ref 4–5.6)
ALBUMIN SERPL ELPH-MCNC: 4.1 G/DL — SIGNIFICANT CHANGE UP (ref 3.5–5.2)
ALP SERPL-CCNC: 105 U/L — SIGNIFICANT CHANGE UP (ref 30–115)
ALT FLD-CCNC: 9 U/L — SIGNIFICANT CHANGE UP (ref 0–41)
ANION GAP SERPL CALC-SCNC: 15 MMOL/L — HIGH (ref 7–14)
APTT BLD: 47.5 SEC — HIGH (ref 27–39.2)
AST SERPL-CCNC: 18 U/L — SIGNIFICANT CHANGE UP (ref 0–41)
BILIRUB SERPL-MCNC: 1.3 MG/DL — HIGH (ref 0.2–1.2)
BUN SERPL-MCNC: 21 MG/DL — HIGH (ref 10–20)
CALCIUM SERPL-MCNC: 8.5 MG/DL — SIGNIFICANT CHANGE UP (ref 8.5–10.1)
CHLORIDE SERPL-SCNC: 104 MMOL/L — SIGNIFICANT CHANGE UP (ref 98–110)
CO2 SERPL-SCNC: 19 MMOL/L — SIGNIFICANT CHANGE UP (ref 17–32)
CREAT SERPL-MCNC: 1.3 MG/DL — SIGNIFICANT CHANGE UP (ref 0.7–1.5)
ESTIMATED AVERAGE GLUCOSE: 100 MG/DL — SIGNIFICANT CHANGE UP (ref 68–114)
GLUCOSE SERPL-MCNC: 134 MG/DL — HIGH (ref 70–99)
HCT VFR BLD CALC: 39.1 % — LOW (ref 42–52)
HGB BLD-MCNC: 12.7 G/DL — LOW (ref 14–18)
INR BLD: 0.84 RATIO — SIGNIFICANT CHANGE UP (ref 0.65–1.3)
MCHC RBC-ENTMCNC: 28.9 PG — SIGNIFICANT CHANGE UP (ref 27–31)
MCHC RBC-ENTMCNC: 32.5 G/DL — SIGNIFICANT CHANGE UP (ref 32–37)
MCV RBC AUTO: 89.1 FL — SIGNIFICANT CHANGE UP (ref 80–94)
NRBC # BLD: 0 /100 WBCS — SIGNIFICANT CHANGE UP (ref 0–0)
PLATELET # BLD AUTO: 133 K/UL — SIGNIFICANT CHANGE UP (ref 130–400)
POTASSIUM SERPL-MCNC: 4.2 MMOL/L — SIGNIFICANT CHANGE UP (ref 3.5–5)
POTASSIUM SERPL-SCNC: 4.2 MMOL/L — SIGNIFICANT CHANGE UP (ref 3.5–5)
PROT SERPL-MCNC: 6.6 G/DL — SIGNIFICANT CHANGE UP (ref 6–8)
PROTHROM AB SERPL-ACNC: 9.7 SEC — LOW (ref 9.95–12.87)
RBC # BLD: 4.39 M/UL — LOW (ref 4.7–6.1)
RBC # FLD: 12.9 % — SIGNIFICANT CHANGE UP (ref 11.5–14.5)
SODIUM SERPL-SCNC: 138 MMOL/L — SIGNIFICANT CHANGE UP (ref 135–146)
WBC # BLD: 6.34 K/UL — SIGNIFICANT CHANGE UP (ref 4.8–10.8)
WBC # FLD AUTO: 6.34 K/UL — SIGNIFICANT CHANGE UP (ref 4.8–10.8)

## 2021-09-02 PROCEDURE — 71046 X-RAY EXAM CHEST 2 VIEWS: CPT | Mod: 26

## 2021-09-02 PROCEDURE — 93010 ELECTROCARDIOGRAM REPORT: CPT

## 2021-09-02 NOTE — H&P PST ADULT - ADDITIONAL PE
yoo catheter in place -YELLOW URINE  unkempt appearance  SKIN DRY-FLAKY  anxious  poor dentition-many missing

## 2021-09-02 NOTE — H&P PST ADULT - HISTORY OF PRESENT ILLNESS
Pt denies cp palp uri cough dysuria or sob. ET: 1 FOS- denies SOB . PT denies any open wounds, drainage or rashes. denies hx of covid-denies recent cough fever or infection. aware to self quaerantine preop. all instructions reviewed.    pt has current yoo catheter in place x 1.5 years. pt states changed by urology q 6 weeks.  called office and spoke to Donna in office.  office is aware and will let dr phillips know ASAP for F/u with patient REGARDING UA/C&S    patient has hx of tachycardia- states non compliant with advise for cardio follow up by dr collins . PT STATES HX TACHYCARDIA X MANY YEARS- ADVISED FOLOW UP WITH ED=- PATIENT REFUSED     As per patient, this is their complete medical and surgical history, including medications both prescribed or over the counter.  poor historian /NON COMPLIANT WITH MEDICAL ADVISE    Patient verbalized understanding of instructions and was given the opportunity to ask questions and have them answered.    Patient denies any signs or symptoms of COVID 19 and denies contact with known positive individuals.  They have an appointment for repeat COVID testing pre-procedure and acknowledge its time and place.  They were instructed to quarantine pre-procedure, practice exposure control measures, continue to self-monitor and report any concerns to their proceduralist.    bp 190/88 today in PST and heart rate 115     patient states "it has been like that for years.  I am not looking into it. I am fine. dr collins knows-he told me to go to heart doctor. "   I’ve discussed with the patient the abnormality found during their pre-procedure evaluation.  They have been told to go to the ER for further evaluation but refuse.  I have discussed the risks    including the possibility of worsening disease, pain, permanent disability, and/or death with them.  They voiced understanding of this to me.  I advised them that they can and should go immediately if they develop any worse/different/additional symptoms, or if they change their mind and want to continue their evaluation as we discussed.          Anesthesia Alert  NO--Difficult Airway  NO--History of neck surgery or radiation  NO--Limited ROM of neck  NO--History of Malignant hyperthermia  NO--Personal or family history of Pseudocholinesterase deficiency.  NO--Prior Anesthesia Complication  NO--Latex Allergy  NO--Loose teeth  NO--History of Rheumatoid Arthritis  NO--KATI  NO--Bleeding risk  yes--Other____foley catheter_               Pt denies cp palp uri cough dysuria or sob. ET: 1 FOS- denies SOB . PT denies any open wounds, drainage or rashes. denies hx of covid-denies recent cough fever or infection. aware to self quaerantine preop. all instructions reviewed.  scheduled for 9/21 procedure cystoscopy - TURP. HX BPH  SPOKE TO YESI   IN OFFICE TO CONFIRM PROCEDURE.     pt states he has enlarged prostate hx with difficulty urinating. catheter in place x 1.5 years.  patient states pain scale 3/10 currently with yoo catheter in place- throbbing intermittent x over 1 year. denies pain meds      pt has current yoo catheter in place x 1.5 years. pt states changed by urology q 6 weeks.  called office and spoke to Donna in office.  office is aware and will let dr phillips know ASAP for F/u with patient REGARDING UA/C&S    patient has hx of tachycardia- states non compliant with advise for cardio follow up by dr collins . PT STATES HX TACHYCARDIA X MANY YEARS- ADVISED FOLOW UP WITH ED=- PATIENT REFUSED     As per patient, this is their complete medical and surgical history, including medications both prescribed or over the counter.  poor historian /NON COMPLIANT WITH MEDICAL ADVISE    Patient verbalized understanding of instructions and was given the opportunity to ask questions and have them answered.    Patient denies any signs or symptoms of COVID 19 and denies contact with known positive individuals.  They have an appointment for repeat COVID testing pre-procedure and acknowledge its time and place.  They were instructed to quarantine pre-procedure, practice exposure control measures, continue to self-monitor and report any concerns to their proceduralist.    bp 190/88 today in PST and heart rate 115     patient states "it has been like that for years.  I am not looking into it. I am fine. dr collins knows-he told me to go to heart doctor. "   I’ve discussed with the patient the abnormality found during their pre-procedure evaluation.  They have been told to go to the ER for further evaluation but refuse.  I have discussed the risks    including the possibility of worsening disease, pain, permanent disability, and/or death with them.  They voiced understanding of this to me.  I advised them that they can and should go immediately if they develop any worse/different/additional symptoms, or if they change their mind and want to continue their evaluation as we discussed.          Anesthesia Alert  NO--Difficult Airway  NO--History of neck surgery or radiation  NO--Limited ROM of neck  NO--History of Malignant hyperthermia  NO--Personal or family history of Pseudocholinesterase deficiency.  NO--Prior Anesthesia Complication  NO--Latex Allergy  NO--Loose teeth  NO--History of Rheumatoid Arthritis  NO--KATI  NO--Bleeding risk  yes--Other____foley catheter_

## 2021-09-02 NOTE — H&P PST ADULT - NSICDXPASTMEDICALHX_GEN_ALL_CORE_FT
PAST MEDICAL HISTORY:  Anxiety     ARF (acute renal failure) 2/2020    Common ichthyosis     DM (diabetes mellitus)     Enlarged prostate     HTN (hypertension)     Inguinal hernia     Peripheral neuropathy     Prostate disease

## 2021-09-02 NOTE — H&P PST ADULT - VASCULAR
SW spoke with pt grandson  He is available  anytime next week for family meeting  SW reached out  to Dr Ted Schuler for scheduling  SW will continue to follow up  Equal and normal pulses (carotid, femoral, dorsalis pedis)

## 2021-09-02 NOTE — H&P PST ADULT - ATTENDING COMMENTS
Side effects of the procedure were discussed including but not limited to bleeding, infection, retention, incontinence, sexual dysfunction, etc.  He wants to proceed  No changes in the HPI.

## 2021-09-07 RX ORDER — METOPROLOL TARTRATE 50 MG
1 TABLET ORAL
Qty: 0 | Refills: 0 | DISCHARGE
Start: 2021-09-07

## 2021-09-18 ENCOUNTER — OUTPATIENT (OUTPATIENT)
Dept: OUTPATIENT SERVICES | Facility: HOSPITAL | Age: 76
LOS: 1 days | Discharge: HOME | End: 2021-09-18

## 2021-09-18 DIAGNOSIS — Z11.59 ENCOUNTER FOR SCREENING FOR OTHER VIRAL DISEASES: ICD-10-CM

## 2021-09-18 DIAGNOSIS — N43.3 HYDROCELE, UNSPECIFIED: Chronic | ICD-10-CM

## 2021-09-19 PROBLEM — N17.9 ACUTE KIDNEY FAILURE, UNSPECIFIED: Chronic | Status: ACTIVE | Noted: 2021-09-02

## 2021-09-19 PROBLEM — F41.9 ANXIETY DISORDER, UNSPECIFIED: Chronic | Status: ACTIVE | Noted: 2021-09-02

## 2021-09-19 PROBLEM — I10 ESSENTIAL (PRIMARY) HYPERTENSION: Chronic | Status: ACTIVE | Noted: 2021-09-02

## 2021-09-19 PROBLEM — E11.9 TYPE 2 DIABETES MELLITUS WITHOUT COMPLICATIONS: Chronic | Status: ACTIVE | Noted: 2021-09-02

## 2021-09-19 PROBLEM — N42.9 DISORDER OF PROSTATE, UNSPECIFIED: Chronic | Status: ACTIVE | Noted: 2021-09-02

## 2021-09-19 PROBLEM — N40.0 BENIGN PROSTATIC HYPERPLASIA WITHOUT LOWER URINARY TRACT SYMPTOMS: Chronic | Status: ACTIVE | Noted: 2021-09-02

## 2021-09-21 ENCOUNTER — INPATIENT (INPATIENT)
Facility: HOSPITAL | Age: 76
LOS: 2 days | Discharge: HOME | End: 2021-09-24
Attending: SPECIALIST | Admitting: SPECIALIST
Payer: MEDICARE

## 2021-09-21 ENCOUNTER — RESULT REVIEW (OUTPATIENT)
Age: 76
End: 2021-09-21

## 2021-09-21 VITALS
OXYGEN SATURATION: 97 % | DIASTOLIC BLOOD PRESSURE: 91 MMHG | TEMPERATURE: 98 F | WEIGHT: 149.91 LBS | HEART RATE: 79 BPM | SYSTOLIC BLOOD PRESSURE: 204 MMHG | RESPIRATION RATE: 18 BRPM | HEIGHT: 66 IN

## 2021-09-21 DIAGNOSIS — R11.0 NAUSEA: ICD-10-CM

## 2021-09-21 DIAGNOSIS — N21.0 CALCULUS IN BLADDER: ICD-10-CM

## 2021-09-21 DIAGNOSIS — N40.1 BENIGN PROSTATIC HYPERPLASIA WITH LOWER URINARY TRACT SYMPTOMS: ICD-10-CM

## 2021-09-21 DIAGNOSIS — N13.9 OBSTRUCTIVE AND REFLUX UROPATHY, UNSPECIFIED: ICD-10-CM

## 2021-09-21 DIAGNOSIS — Z79.84 LONG TERM (CURRENT) USE OF ORAL HYPOGLYCEMIC DRUGS: ICD-10-CM

## 2021-09-21 DIAGNOSIS — L85.0 ACQUIRED ICHTHYOSIS: ICD-10-CM

## 2021-09-21 DIAGNOSIS — R33.8 OTHER RETENTION OF URINE: ICD-10-CM

## 2021-09-21 DIAGNOSIS — N43.3 HYDROCELE, UNSPECIFIED: Chronic | ICD-10-CM

## 2021-09-21 DIAGNOSIS — D62 ACUTE POSTHEMORRHAGIC ANEMIA: ICD-10-CM

## 2021-09-21 DIAGNOSIS — Z23 ENCOUNTER FOR IMMUNIZATION: ICD-10-CM

## 2021-09-21 DIAGNOSIS — I10 ESSENTIAL (PRIMARY) HYPERTENSION: ICD-10-CM

## 2021-09-21 DIAGNOSIS — R31.9 HEMATURIA, UNSPECIFIED: ICD-10-CM

## 2021-09-21 DIAGNOSIS — Z98.890 OTHER SPECIFIED POSTPROCEDURAL STATES: ICD-10-CM

## 2021-09-21 DIAGNOSIS — F41.9 ANXIETY DISORDER, UNSPECIFIED: ICD-10-CM

## 2021-09-21 DIAGNOSIS — E11.40 TYPE 2 DIABETES MELLITUS WITH DIABETIC NEUROPATHY, UNSPECIFIED: ICD-10-CM

## 2021-09-21 LAB
ANION GAP SERPL CALC-SCNC: 11 MMOL/L — SIGNIFICANT CHANGE UP (ref 7–14)
BASOPHILS # BLD AUTO: 0.04 K/UL — SIGNIFICANT CHANGE UP (ref 0–0.2)
BASOPHILS NFR BLD AUTO: 0.4 % — SIGNIFICANT CHANGE UP (ref 0–1)
BUN SERPL-MCNC: 19 MG/DL — SIGNIFICANT CHANGE UP (ref 10–20)
CALCIUM SERPL-MCNC: 8.4 MG/DL — LOW (ref 8.5–10.1)
CHLORIDE SERPL-SCNC: 104 MMOL/L — SIGNIFICANT CHANGE UP (ref 98–110)
CO2 SERPL-SCNC: 21 MMOL/L — SIGNIFICANT CHANGE UP (ref 17–32)
CREAT SERPL-MCNC: 1.3 MG/DL — SIGNIFICANT CHANGE UP (ref 0.7–1.5)
EOSINOPHIL # BLD AUTO: 0.01 K/UL — SIGNIFICANT CHANGE UP (ref 0–0.7)
EOSINOPHIL NFR BLD AUTO: 0.1 % — SIGNIFICANT CHANGE UP (ref 0–8)
GLUCOSE BLDC GLUCOMTR-MCNC: 128 MG/DL — HIGH (ref 70–99)
GLUCOSE BLDC GLUCOMTR-MCNC: 180 MG/DL — HIGH (ref 70–99)
GLUCOSE SERPL-MCNC: 187 MG/DL — HIGH (ref 70–99)
HCT VFR BLD CALC: 44.5 % — SIGNIFICANT CHANGE UP (ref 42–52)
HGB BLD-MCNC: 14.5 G/DL — SIGNIFICANT CHANGE UP (ref 14–18)
IMM GRANULOCYTES NFR BLD AUTO: 0.8 % — HIGH (ref 0.1–0.3)
LYMPHOCYTES # BLD AUTO: 0.69 K/UL — LOW (ref 1.2–3.4)
LYMPHOCYTES # BLD AUTO: 7.3 % — LOW (ref 20.5–51.1)
MCHC RBC-ENTMCNC: 29.5 PG — SIGNIFICANT CHANGE UP (ref 27–31)
MCHC RBC-ENTMCNC: 32.6 G/DL — SIGNIFICANT CHANGE UP (ref 32–37)
MCV RBC AUTO: 90.6 FL — SIGNIFICANT CHANGE UP (ref 80–94)
MONOCYTES # BLD AUTO: 0.17 K/UL — SIGNIFICANT CHANGE UP (ref 0.1–0.6)
MONOCYTES NFR BLD AUTO: 1.8 % — SIGNIFICANT CHANGE UP (ref 1.7–9.3)
NEUTROPHILS # BLD AUTO: 8.47 K/UL — HIGH (ref 1.4–6.5)
NEUTROPHILS NFR BLD AUTO: 89.6 % — HIGH (ref 42.2–75.2)
NRBC # BLD: 0 /100 WBCS — SIGNIFICANT CHANGE UP (ref 0–0)
PLATELET # BLD AUTO: 143 K/UL — SIGNIFICANT CHANGE UP (ref 130–400)
POTASSIUM SERPL-MCNC: 4.6 MMOL/L — SIGNIFICANT CHANGE UP (ref 3.5–5)
POTASSIUM SERPL-SCNC: 4.6 MMOL/L — SIGNIFICANT CHANGE UP (ref 3.5–5)
RBC # BLD: 4.91 M/UL — SIGNIFICANT CHANGE UP (ref 4.7–6.1)
RBC # FLD: 12.6 % — SIGNIFICANT CHANGE UP (ref 11.5–14.5)
SODIUM SERPL-SCNC: 136 MMOL/L — SIGNIFICANT CHANGE UP (ref 135–146)
WBC # BLD: 9.46 K/UL — SIGNIFICANT CHANGE UP (ref 4.8–10.8)
WBC # FLD AUTO: 9.46 K/UL — SIGNIFICANT CHANGE UP (ref 4.8–10.8)

## 2021-09-21 PROCEDURE — 88305 TISSUE EXAM BY PATHOLOGIST: CPT | Mod: 26

## 2021-09-21 RX ORDER — HEPARIN SODIUM 5000 [USP'U]/ML
5000 INJECTION INTRAVENOUS; SUBCUTANEOUS EVERY 8 HOURS
Refills: 0 | Status: DISCONTINUED | OUTPATIENT
Start: 2021-09-21 | End: 2021-09-22

## 2021-09-21 RX ORDER — HYDROMORPHONE HYDROCHLORIDE 2 MG/ML
0.5 INJECTION INTRAMUSCULAR; INTRAVENOUS; SUBCUTANEOUS
Refills: 0 | Status: DISCONTINUED | OUTPATIENT
Start: 2021-09-21 | End: 2021-09-24

## 2021-09-21 RX ORDER — CEFAZOLIN SODIUM 1 G
2000 VIAL (EA) INJECTION EVERY 8 HOURS
Refills: 0 | Status: COMPLETED | OUTPATIENT
Start: 2021-09-21 | End: 2021-09-22

## 2021-09-21 RX ORDER — SODIUM CHLORIDE 9 MG/ML
1000 INJECTION INTRAMUSCULAR; INTRAVENOUS; SUBCUTANEOUS
Refills: 0 | Status: DISCONTINUED | OUTPATIENT
Start: 2021-09-21 | End: 2021-09-23

## 2021-09-21 RX ORDER — MORPHINE SULFATE 50 MG/1
2 CAPSULE, EXTENDED RELEASE ORAL
Refills: 0 | Status: DISCONTINUED | OUTPATIENT
Start: 2021-09-21 | End: 2021-09-24

## 2021-09-21 RX ORDER — DIPHENHYDRAMINE HCL 50 MG
25 CAPSULE ORAL ONCE
Refills: 0 | Status: COMPLETED | OUTPATIENT
Start: 2021-09-21 | End: 2021-09-22

## 2021-09-21 RX ORDER — ONDANSETRON 8 MG/1
4 TABLET, FILM COATED ORAL
Refills: 0 | Status: COMPLETED | OUTPATIENT
Start: 2021-09-21 | End: 2021-09-22

## 2021-09-21 RX ORDER — HYDRALAZINE HCL 50 MG
25 TABLET ORAL THREE TIMES A DAY
Refills: 0 | Status: DISCONTINUED | OUTPATIENT
Start: 2021-09-21 | End: 2021-09-24

## 2021-09-21 RX ORDER — TAMSULOSIN HYDROCHLORIDE 0.4 MG/1
0.4 CAPSULE ORAL AT BEDTIME
Refills: 0 | Status: DISCONTINUED | OUTPATIENT
Start: 2021-09-21 | End: 2021-09-24

## 2021-09-21 RX ORDER — METFORMIN HYDROCHLORIDE 850 MG/1
1000 TABLET ORAL
Refills: 0 | Status: DISCONTINUED | OUTPATIENT
Start: 2021-09-21 | End: 2021-09-24

## 2021-09-21 RX ORDER — OXYCODONE AND ACETAMINOPHEN 5; 325 MG/1; MG/1
1 TABLET ORAL ONCE
Refills: 0 | Status: DISCONTINUED | OUTPATIENT
Start: 2021-09-21 | End: 2021-09-22

## 2021-09-21 RX ORDER — MORPHINE SULFATE 50 MG/1
2 CAPSULE, EXTENDED RELEASE ORAL EVERY 4 HOURS
Refills: 0 | Status: DISCONTINUED | OUTPATIENT
Start: 2021-09-21 | End: 2021-09-24

## 2021-09-21 RX ORDER — METOPROLOL TARTRATE 50 MG
25 TABLET ORAL
Refills: 0 | Status: DISCONTINUED | OUTPATIENT
Start: 2021-09-21 | End: 2021-09-24

## 2021-09-21 RX ORDER — IRBESARTAN 75 MG/1
1 TABLET ORAL
Qty: 0 | Refills: 0 | DISCHARGE

## 2021-09-21 RX ORDER — INFLUENZA VIRUS VACCINE 15; 15; 15; 15 UG/.5ML; UG/.5ML; UG/.5ML; UG/.5ML
0.5 SUSPENSION INTRAMUSCULAR ONCE
Refills: 0 | Status: COMPLETED | OUTPATIENT
Start: 2021-09-21 | End: 2021-09-24

## 2021-09-21 RX ORDER — METFORMIN HYDROCHLORIDE 850 MG/1
1 TABLET ORAL
Qty: 0 | Refills: 0 | DISCHARGE

## 2021-09-21 RX ORDER — METOCLOPRAMIDE HCL 10 MG
10 TABLET ORAL ONCE
Refills: 0 | Status: COMPLETED | OUTPATIENT
Start: 2021-09-21 | End: 2021-09-21

## 2021-09-21 RX ORDER — ACETAMINOPHEN 500 MG
650 TABLET ORAL EVERY 6 HOURS
Refills: 0 | Status: DISCONTINUED | OUTPATIENT
Start: 2021-09-21 | End: 2021-09-24

## 2021-09-21 RX ORDER — SODIUM CHLORIDE 9 MG/ML
1000 INJECTION, SOLUTION INTRAVENOUS
Refills: 0 | Status: DISCONTINUED | OUTPATIENT
Start: 2021-09-21 | End: 2021-09-21

## 2021-09-21 RX ORDER — LOSARTAN POTASSIUM 100 MG/1
50 TABLET, FILM COATED ORAL DAILY
Refills: 0 | Status: DISCONTINUED | OUTPATIENT
Start: 2021-09-21 | End: 2021-09-24

## 2021-09-21 RX ORDER — SENNA PLUS 8.6 MG/1
1 TABLET ORAL AT BEDTIME
Refills: 0 | Status: DISCONTINUED | OUTPATIENT
Start: 2021-09-21 | End: 2021-09-24

## 2021-09-21 RX ADMIN — TAMSULOSIN HYDROCHLORIDE 0.4 MILLIGRAM(S): 0.4 CAPSULE ORAL at 21:51

## 2021-09-21 RX ADMIN — SODIUM CHLORIDE 75 MILLILITER(S): 9 INJECTION INTRAMUSCULAR; INTRAVENOUS; SUBCUTANEOUS at 17:59

## 2021-09-21 RX ADMIN — SODIUM CHLORIDE 75 MILLILITER(S): 9 INJECTION, SOLUTION INTRAVENOUS at 13:00

## 2021-09-21 RX ADMIN — ONDANSETRON 4 MILLIGRAM(S): 8 TABLET, FILM COATED ORAL at 20:53

## 2021-09-21 RX ADMIN — HYDROMORPHONE HYDROCHLORIDE 0.5 MILLIGRAM(S): 2 INJECTION INTRAMUSCULAR; INTRAVENOUS; SUBCUTANEOUS at 13:21

## 2021-09-21 RX ADMIN — METFORMIN HYDROCHLORIDE 1000 MILLIGRAM(S): 850 TABLET ORAL at 17:58

## 2021-09-21 RX ADMIN — HYDROMORPHONE HYDROCHLORIDE 0.5 MILLIGRAM(S): 2 INJECTION INTRAMUSCULAR; INTRAVENOUS; SUBCUTANEOUS at 15:17

## 2021-09-21 RX ADMIN — Medication 25 MILLIGRAM(S): at 21:51

## 2021-09-21 RX ADMIN — HEPARIN SODIUM 5000 UNIT(S): 5000 INJECTION INTRAVENOUS; SUBCUTANEOUS at 21:51

## 2021-09-21 RX ADMIN — SENNA PLUS 1 TABLET(S): 8.6 TABLET ORAL at 21:51

## 2021-09-21 RX ADMIN — LOSARTAN POTASSIUM 50 MILLIGRAM(S): 100 TABLET, FILM COATED ORAL at 17:58

## 2021-09-21 RX ADMIN — Medication 25 MILLIGRAM(S): at 17:58

## 2021-09-21 RX ADMIN — Medication 10 MILLIGRAM(S): at 23:04

## 2021-09-21 RX ADMIN — Medication 100 MILLIGRAM(S): at 21:00

## 2021-09-21 RX ADMIN — Medication 25 MILLIGRAM(S): at 18:04

## 2021-09-21 RX ADMIN — HEPARIN SODIUM 5000 UNIT(S): 5000 INJECTION INTRAVENOUS; SUBCUTANEOUS at 17:58

## 2021-09-21 NOTE — H&P ADULT - CLICK TO LAUNCH ORM
Chief Complaint   Patient presents with    Labs     fasting    Hypertension    Diabetes    Cholesterol Problem    Allergies     medication recomindation         Assessment/ Plan:   Diagnoses and all orders for this visit:    1. Essential hypertension  -     METABOLIC PANEL, COMPREHENSIVE; Future   Well controlled on current medications. No change. 2. Elevated glucose  -     HEMOGLOBIN A1C WITH EAG; Future    3. Vitamin D deficiency  -     VITAMIN D, 25 HYDROXY; Future    4. Pure hypercholesterolemia  -     METABOLIC PANEL, COMPREHENSIVE; Future  -     CBC WITH AUTOMATED DIFF; Future  -     LIPID PANEL; Future    5. Screening for prostate cancer  -     PSA SCREENING (SCREENING); Future      I have discussed the diagnosis with the patient and the intended treatment plan as seen in the above orders. The patient has received an after-visit summary and questions were answered concerning future plans. Asked to return should symptoms worsen or not improve with treatment. Any pending labs and studies will be relayed to patient when they become available. Pt verbalizes understanding of plan of care and denies further questions or concerns at this time. Follow-up and Dispositions    · Return if symptoms worsen or fail to improve. Subjective:   Donald Schreiber is a 68 y.o. male who presents for general follow up of HTN, HLD and T2DM. He is fasting for labs today. He reports that he has had the first COVID-19 [employer]) vaccine and is scheduled for the 2nd one in about a week. He will bring in his completed card. He is otherwise doing well. Denies CP, SOB. BP is excellently controlled today. No other worrisome symptoms. He wanted a recommendation for allergy medications. He is currently having nasal and eye drainage.      Patient Active Problem List    Diagnosis Date Noted    Chest pain 09/08/2020    Colitis 09/08/2020    Increased frequency of urination 09/08/2020    Nonspecific ST-T wave . electrocardiographic changes 09/08/2020    Palpitations 09/08/2020    Anxiety 09/05/2018    Tinnitus of left ear 01/30/2018    Essential hypertension 01/30/2018    Pure hypercholesterolemia 01/30/2018         Current Outpatient Medications   Medication Sig Dispense Refill    atorvastatin (LIPITOR) 10 mg tablet TAKE 1 TABLET BY MOUTH EVERY DAY 90 Tab 1    amLODIPine (NORVASC) 10 mg tablet TAKE 1 TABLET BY MOUTH EVERY DAY 90 Tab 0    metoprolol succinate (TOPROL-XL) 50 mg XL tablet TAKE 1 TABLET BY MOUTH EVERY DAY 90 Tab 1    diclofenac EC (VOLTAREN) 50 mg EC tablet TAKE 1 TAB BY MOUTH TWO (2) TIMES DAILY AS NEEDED. 180 Tab 0    aspirin delayed-release 81 mg tablet Take 81 mg by mouth daily.  lisinopriL (PRINIVIL, ZESTRIL) 20 mg tablet TAKE 2 TABLETS DAILY (Patient taking differently: 20 mg two (2) times a day. TAKE 2 TABLETS DAILY) 180 Tab 1    famotidine (PEPCID) 20 mg tablet TAKE 1 TABLET BY MOUTH TWICE A DAY (Patient taking differently: as needed.) 180 Tab 0    busPIRone (BUSPAR) 5 mg tablet TAKE 1 TABLET BY MOUTH TWICE A DAY (Patient taking differently: Take 5 mg by mouth two (2) times a day.) 180 Tab 1    ALPRAZolam (XANAX) 0.5 mg tablet Take 0.5 mg by mouth nightly as needed. 1    multivitamin, tx-iron-ca-min (THERA-M W/ IRON) 9 mg iron-400 mcg tab tablet Take 1 Tab by mouth daily. Allergies   Allergen Reactions    Losartan Other (comments)     Shaking, feeling unbalanced.           Past Medical History:   Diagnosis Date    Arrhythmia     Arthritis     knees    Diverticulitis     High cholesterol     Hyperlipidemia     Hypertension     Irregular heart beat     Tinnitus     Tinnitus 03/27/2019         Past Surgical History:   Procedure Laterality Date    COLONOSCOPY N/A 12/17/2018    COLONOSCOPY performed by Arcadio Mattson MD at [facility] ENDOSCOPY         Family History   Problem Relation Age of Onset    Hypertension Mother     Stroke Father     Diabetes Sister    Sam Leal Hypertension Sister          Social History     Tobacco Use    Smoking status: Never Smoker    Smokeless tobacco: Never Used   Substance Use Topics    Alcohol use: Yes     Alcohol/week: 7.0 - 8.0 standard drinks     Types: 4 Shots of liquor, 3 - 4 Standard drinks or equivalent per week     Comment: socially          Review of Systems    Pertinent items are noted in HPI. Objective:     Vitals:    04/21/21 1001   BP: 124/64   Pulse: (!) 56   Resp: 20   Temp: 98.8 °F (37.1 °C)   TempSrc: Temporal   SpO2: 96%   Weight: 195 lb (88.5 kg)   Height: 5' 11\" (1.803 m)       General: alert, cooperative, no distress   Mental  status: normal mood, behavior, speech, dress, motor activity, and thought processes, able to follow commands   HENT: NCAT   Neck: no visualized mass   Resp: no respiratory distress   Neuro: no gross deficits   Skin: no discoloration or lesions of concern on visible areas   Psychiatric: normal affect, consistent with stated mood, no evidence of hallucinations     Layman MD Ar[place]ia    Patient Instructions          Allergies: Care Instructions  Your Care Instructions     Allergies occur when your body's defense system (immune system) overreacts to certain substances. The immune system treats a harmless substance as if it were a harmful germ or virus. Many things can make this happen. These include pollens, medicine, food, dust, animal dander, and mold. Allergies can be mild or severe. Mild allergies can be managed with home treatment. But medicine may be needed to prevent problems. Managing your allergies is an important part of staying healthy. Your doctor may suggest that you have allergy testing to help find out what is causing your allergies. Severe allergies can cause reactions that affect your whole body (anaphylactic reactions). Your doctor may prescribe a shot of epinephrine to carry with you in case you have a severe reaction.  Learn how to give yourself the shot and keep it with you at all times. Make sure it is not . Follow-up care is a key part of your treatment and safety. Be sure to make and go to all appointments, and call your doctor if you are having problems. It's also a good idea to know your test results and keep a list of the medicines you take. How can you care for yourself at home? · If you have been told by your doctor that dust or dust mites are causing your allergy, decrease the dust around your bed:  ? Wash sheets, pillowcases, and other bedding in hot water every week. ? Use dust-proof covers for pillows, duvets, and mattresses. Avoid plastic covers because they tear easily and do not \"breathe. \" Wash as instructed on the label. ? Do not use any blankets and pillows that you do not need. ? Use blankets that you can wash in your washing machine. ? Consider removing drapes and carpets, which attract and hold dust, from your bedroom. · If you are allergic to house dust and mites, do not use home humidifiers. Your doctor can suggest ways you can control dust and mites. · Look for signs of cockroaches. Cockroaches cause allergic reactions. Use cockroach baits to get rid of them. Then, clean your home well. Cockroaches like areas where grocery bags, newspapers, empty bottles, or cardboard boxes are stored. Do not keep these inside your home, and keep trash and food containers sealed. Seal off any spots where cockroaches might enter your home. · If you are allergic to mold, get rid of furniture, rugs, and drapes that smell musty. Check for mold in the bathroom. · If you are allergic to outdoor pollen or mold spores, use air-conditioning. Change or clean all filters every month. Keep windows closed. · If you are allergic to pollen, stay inside when pollen counts are high. Use a vacuum  with a HEPA filter or a double-thickness filter at least two times each week. · Stay inside when air pollution is bad.  Avoid paint fumes, perfumes, and other strong odors.  · Avoid conditions that make your allergies worse. Stay away from smoke. Do not smoke or let anyone else smoke in your house. Do not use fireplaces or wood-burning stoves. · If you are allergic to your pets, change the air filter in your furnace every month. Use high-efficiency filters. · If you are allergic to pet dander, keep pets outside or out of your bedroom. Old carpet and cloth furniture can hold a lot of animal dander. You may need to replace them. When should you call for help? Give an epinephrine shot if:    · You think you are having a severe allergic reaction.     · You have symptoms in more than one body area, such as mild nausea and an itchy mouth. After giving an epinephrine shot call 911, even if you feel better. Call 911 if:    · You have symptoms of a severe allergic reaction. These may include:  ? Sudden raised, red areas (hives) all over your body. ? Swelling of the throat, mouth, lips, or tongue. ? Trouble breathing. ? Passing out (losing consciousness). Or you may feel very lightheaded or suddenly feel weak, confused, or restless.     · You have been given an epinephrine shot, even if you feel better. Call your doctor now or seek immediate medical care if:    · You have symptoms of an allergic reaction, such as:  ? A rash or hives (raised, red areas on the skin). ? Itching. ? Swelling. ? Belly pain, nausea, or vomiting. Watch closely for changes in your health, and be sure to contact your doctor if:    · You do not get better as expected. Where can you learn more? Go to http://www.[facility].com/  Enter W171 in the search box to learn more about \"Allergies: Care Instructions. \"  Current as of: November 6, 2020               Content Version: 12.8  © 1988-6788 [employer]. Care instructions adapted under license by [employer] (which disclaims liability or warranty for this information).  If you have questions about a medical condition or this instruction, always ask your healthcare professional. [place] [address] any warranty or liability for your use of this information.

## 2021-09-21 NOTE — H&P ADULT - HISTORY OF PRESENT ILLNESS
76M w/PMHx of HTN, DM2, BPH w/UR with chronic indwelling yoo presents for elective cysto with Turp.  Patient admitted post procedure.  patient seen and examined at bedside in RR, no complaints.  No CP/SOB.  No abdominal or urinary complaints.

## 2021-09-21 NOTE — H&P ADULT - ASSESSMENT
76M w/PMHx of HTN, DM2, BPH w/UR with chronic indwelling yoo presents for elective cysto with Turp.     #Hx BPH w/chronic UR s/p TURP POD #0  - admit to Surgery  - continue with Yoo cath and bladder irrigation overnight  - TOV tomorrow if urine clears  - c/w Flomax  - DVT PPX (SCD's)  - CHG 4% QD and PRN     #HTN  - c/w home meds  - DASH diet    #DM2  - c/w Metformin 1g BID  - [facility] diet

## 2021-09-21 NOTE — H&P ADULT - NSHPPHYSICALEXAM_GEN_ALL_CORE
Vital Signs (24 Hrs):  T(C): 35.9 (09-21-21 @ 12:24), Max: 36.4 (09-21-21 @ 08:58)  HR: 81 (09-21-21 @ 12:54) (79 - 100)  BP: 171/93 (09-21-21 @ 12:54) (143/88 - 204/91)  RR: 17 (09-21-21 @ 12:54) (13 - 18)  SpO2: 97% (09-21-21 @ 12:54) (97% - 99%)  Wt(kg): --  Daily Height in cm: 167.64 (21 Sep 2021 10:06)    Daily     I&O's Summary    PHYSICAL EXAM:  GENERAL: NAD, well-developed  SKIN: No rashes or lesions  HEAD:  Atraumatic, Normocephalic  EYES: EOMI, PERRLA, conjunctiva and sclera clear  NECK: Supple, No JVD  CHEST/LUNG: Clear to auscultation bilaterally; No wheeze  HEART: Regular rate and rhythm; No murmurs, rubs, or gallops  ABDOMEN: Soft, Nontender, Nondistended; Bowel sounds present  : 3 way Julien cath present with active bladder irrigation.  Julien bag with fruit punch colored urine  EXTREMITIES:  No clubbing, cyanosis, or edema  CNS: AAOx3

## 2021-09-22 DIAGNOSIS — Z02.9 ENCOUNTER FOR ADMINISTRATIVE EXAMINATIONS, UNSPECIFIED: ICD-10-CM

## 2021-09-22 LAB
ANION GAP SERPL CALC-SCNC: 14 MMOL/L — SIGNIFICANT CHANGE UP (ref 7–14)
BASOPHILS # BLD AUTO: 0.02 K/UL — SIGNIFICANT CHANGE UP (ref 0–0.2)
BASOPHILS NFR BLD AUTO: 0.2 % — SIGNIFICANT CHANGE UP (ref 0–1)
BUN SERPL-MCNC: 22 MG/DL — HIGH (ref 10–20)
CALCIUM SERPL-MCNC: 8.2 MG/DL — LOW (ref 8.5–10.1)
CHLORIDE SERPL-SCNC: 103 MMOL/L — SIGNIFICANT CHANGE UP (ref 98–110)
CO2 SERPL-SCNC: 20 MMOL/L — SIGNIFICANT CHANGE UP (ref 17–32)
CREAT SERPL-MCNC: 1.4 MG/DL — SIGNIFICANT CHANGE UP (ref 0.7–1.5)
EOSINOPHIL # BLD AUTO: 0 K/UL — SIGNIFICANT CHANGE UP (ref 0–0.7)
EOSINOPHIL NFR BLD AUTO: 0 % — SIGNIFICANT CHANGE UP (ref 0–8)
GLUCOSE SERPL-MCNC: 149 MG/DL — HIGH (ref 70–99)
HCT VFR BLD CALC: 40 % — LOW (ref 42–52)
HGB BLD-MCNC: 13.4 G/DL — LOW (ref 14–18)
IMM GRANULOCYTES NFR BLD AUTO: 0.6 % — HIGH (ref 0.1–0.3)
LYMPHOCYTES # BLD AUTO: 1.06 K/UL — LOW (ref 1.2–3.4)
LYMPHOCYTES # BLD AUTO: 8 % — LOW (ref 20.5–51.1)
MCHC RBC-ENTMCNC: 29.7 PG — SIGNIFICANT CHANGE UP (ref 27–31)
MCHC RBC-ENTMCNC: 33.5 G/DL — SIGNIFICANT CHANGE UP (ref 32–37)
MCV RBC AUTO: 88.7 FL — SIGNIFICANT CHANGE UP (ref 80–94)
MONOCYTES # BLD AUTO: 1.15 K/UL — HIGH (ref 0.1–0.6)
MONOCYTES NFR BLD AUTO: 8.7 % — SIGNIFICANT CHANGE UP (ref 1.7–9.3)
NEUTROPHILS # BLD AUTO: 10.95 K/UL — HIGH (ref 1.4–6.5)
NEUTROPHILS NFR BLD AUTO: 82.5 % — HIGH (ref 42.2–75.2)
NRBC # BLD: 0 /100 WBCS — SIGNIFICANT CHANGE UP (ref 0–0)
PLATELET # BLD AUTO: 157 K/UL — SIGNIFICANT CHANGE UP (ref 130–400)
POTASSIUM SERPL-MCNC: 4.2 MMOL/L — SIGNIFICANT CHANGE UP (ref 3.5–5)
POTASSIUM SERPL-SCNC: 4.2 MMOL/L — SIGNIFICANT CHANGE UP (ref 3.5–5)
RBC # BLD: 4.51 M/UL — LOW (ref 4.7–6.1)
RBC # FLD: 12.8 % — SIGNIFICANT CHANGE UP (ref 11.5–14.5)
SODIUM SERPL-SCNC: 137 MMOL/L — SIGNIFICANT CHANGE UP (ref 135–146)
SURGICAL PATHOLOGY STUDY: SIGNIFICANT CHANGE UP
WBC # BLD: 13.26 K/UL — HIGH (ref 4.8–10.8)
WBC # FLD AUTO: 13.26 K/UL — HIGH (ref 4.8–10.8)

## 2021-09-22 RX ORDER — ONDANSETRON 8 MG/1
4 TABLET, FILM COATED ORAL EVERY 6 HOURS
Refills: 0 | Status: DISCONTINUED | OUTPATIENT
Start: 2021-09-22 | End: 2021-09-24

## 2021-09-22 RX ORDER — METOCLOPRAMIDE HCL 10 MG
10 TABLET ORAL ONCE
Refills: 0 | Status: COMPLETED | OUTPATIENT
Start: 2021-09-22 | End: 2021-09-22

## 2021-09-22 RX ORDER — OXYCODONE AND ACETAMINOPHEN 5; 325 MG/1; MG/1
1 TABLET ORAL ONCE
Refills: 0 | Status: DISCONTINUED | OUTPATIENT
Start: 2021-09-22 | End: 2021-09-22

## 2021-09-22 RX ADMIN — MORPHINE SULFATE 2 MILLIGRAM(S): 50 CAPSULE, EXTENDED RELEASE ORAL at 12:22

## 2021-09-22 RX ADMIN — SODIUM CHLORIDE 75 MILLILITER(S): 9 INJECTION INTRAMUSCULAR; INTRAVENOUS; SUBCUTANEOUS at 21:50

## 2021-09-22 RX ADMIN — ONDANSETRON 4 MILLIGRAM(S): 8 TABLET, FILM COATED ORAL at 12:22

## 2021-09-22 RX ADMIN — MORPHINE SULFATE 2 MILLIGRAM(S): 50 CAPSULE, EXTENDED RELEASE ORAL at 12:59

## 2021-09-22 RX ADMIN — Medication 25 MILLIGRAM(S): at 05:21

## 2021-09-22 RX ADMIN — Medication 10 MILLIGRAM(S): at 09:37

## 2021-09-22 RX ADMIN — Medication 25 MILLIGRAM(S): at 13:24

## 2021-09-22 RX ADMIN — LOSARTAN POTASSIUM 50 MILLIGRAM(S): 100 TABLET, FILM COATED ORAL at 05:21

## 2021-09-22 RX ADMIN — SENNA PLUS 1 TABLET(S): 8.6 TABLET ORAL at 21:07

## 2021-09-22 RX ADMIN — Medication 25 MILLIGRAM(S): at 17:24

## 2021-09-22 RX ADMIN — OXYCODONE AND ACETAMINOPHEN 1 TABLET(S): 5; 325 TABLET ORAL at 15:03

## 2021-09-22 RX ADMIN — Medication 25 MILLIGRAM(S): at 00:12

## 2021-09-22 RX ADMIN — Medication 100 MILLIGRAM(S): at 05:18

## 2021-09-22 RX ADMIN — TAMSULOSIN HYDROCHLORIDE 0.4 MILLIGRAM(S): 0.4 CAPSULE ORAL at 21:06

## 2021-09-22 RX ADMIN — Medication 25 MILLIGRAM(S): at 21:06

## 2021-09-22 RX ADMIN — ONDANSETRON 4 MILLIGRAM(S): 8 TABLET, FILM COATED ORAL at 05:16

## 2021-09-22 RX ADMIN — HEPARIN SODIUM 5000 UNIT(S): 5000 INJECTION INTRAVENOUS; SUBCUTANEOUS at 05:22

## 2021-09-22 RX ADMIN — OXYCODONE AND ACETAMINOPHEN 1 TABLET(S): 5; 325 TABLET ORAL at 21:51

## 2021-09-22 RX ADMIN — METFORMIN HYDROCHLORIDE 1000 MILLIGRAM(S): 850 TABLET ORAL at 17:24

## 2021-09-22 NOTE — CHART NOTE - NSCHARTNOTEFT_GEN_A_CORE
Patient seen and examined at bedside.  Had some nausea over night which required Reglan, now improved.  Still with some abdominal discomfort, especially while ambulating but otherwise asymptomatic.  CBI running over night, urine straw colored.  No other complaints.      Vital Signs Last 24 Hrs  T(C): 36.8 (22 Sep 2021 05:26), Max: 36.8 (21 Sep 2021 16:13)  T(F): 98.2 (22 Sep 2021 05:26), Max: 98.2 (21 Sep 2021 16:13)  HR: 94 (22 Sep 2021 05:26) (64 - 107)  BP: 137/76 (22 Sep 2021 05:26) (137/76 - 204/91)  BP(mean): 111 (21 Sep 2021 12:24) (111 - 111)  RR: 18 (22 Sep 2021 05:26) (12 - 19)  SpO2: 96% (21 Sep 2021 14:40) (96% - 99%)    PHYSICAL EXAM:  GENERAL: NAD, well-developed  CHEST/LUNG: Clear to auscultation bilaterally; No wheeze  HEART: Regular rate and rhythm; No murmurs, rubs, or gallops  ABDOMEN: Soft, Nontender, Nondistended; Bowel sounds present  : + 3 way cath in place attached to Julien bag with straw urine in baf  EXTREMITIES:  No clubbing, cyanosis, or edema  CNS: AAOx3    A/P  BPH s/p TURP POD #1  - Hold CBI, cap port  - monitor UO  - monitor symptoms  - FU am labs  - if remains stable, dc this afternoon and FU in AM with attending for Cath removal
PACU ANESTHESIA ADMISSION NOTE      Procedure: TURP, electrosurgical      Post op diagnosis:  Urine retention        ____  Intubated  TV:______       Rate: ______      FiO2: ______    __x__  Patent Airway    __x__  Full return of protective reflexes    __x__  Full recovery from anesthesia / back to baseline     Vitals:   T:     98      R:     22             BP:     137/58             Sat:   97                P: 68      Mental Status:  __x__ Awake   _____ Alert   _____ Drowsy   _____ Sedated    Nausea/Vomiting:  __x__ NO  ______Yes,   See Post - Op Orders          Pain Scale (0-10):  _0____    Treatment: ____ None    ___x_ See Post - Op/PCA Orders    Post - Operative Fluids:   ____ Oral   ___x_ See Post - Op Orders    Plan: Discharge:   ____Home       _x____Floor     _____Critical Care    _____  Other:_________________    Comments:

## 2021-09-22 NOTE — PROGRESS NOTE ADULT - SUBJECTIVE AND OBJECTIVE BOX
Patient seen and examined at bedside.  Had some nausea over night which required Reglan, now improved.  Still with some abdominal discomfort, especially while ambulating but otherwise asymptomatic.  CBI running over night, urine straw colored.  No other complaints.      Vital Signs Last 24 Hrs  T(C): 36.8 (22 Sep 2021 05:26), Max: 36.8 (21 Sep 2021 16:13)  T(F): 98.2 (22 Sep 2021 05:26), Max: 98.2 (21 Sep 2021 16:13)  HR: 94 (22 Sep 2021 05:26) (64 - 107)  BP: 137/76 (22 Sep 2021 05:26) (137/76 - 196/88)  BP(mean): 111 (21 Sep 2021 12:24) (111 - 111)  RR: 18 (22 Sep 2021 05:26) (12 - 19)  SpO2: 96% (21 Sep 2021 14:40) (96% - 99%)    General Appearance: NAD  Chest: CTA B/L  CV: S1 S2  Abdomen: Soft, NT/ND,+bs,  no rebound/gaurding  Extremities: No edema BLE  CNS: A/O x 3    Julien:  NGT:  XIMENA:    I&O's Summary    21 Sep 2021 07:01  -  22 Sep 2021 07:00  --------------------------------------------------------  IN: 23424 mL / OUT: 09484 mL / NET: 6225 mL    22 Sep 2021 07:01  -  22 Sep 2021 11:22  --------------------------------------------------------  IN: 89832 mL / OUT: 6000 mL / NET: 4900 mL      I&O's Detail    21 Sep 2021 07:01  -  22 Sep 2021 07:00  --------------------------------------------------------  IN:    Continuous Bladder Irrigation (mL): 67470 mL    IV PiggyBack: 100 mL    sodium chloride 0.9%: 825 mL  Total IN: 22679 mL    OUT:    Continuous Bladder Irrigation (mL): 39679 mL  Total OUT: 45286 mL    Total NET: 6225 mL      22 Sep 2021 07:01  -  22 Sep 2021 11:22  --------------------------------------------------------  IN:    Continuous Bladder Irrigation (mL): 77194 mL  Total IN: 97687 mL    OUT:    Continuous Bladder Irrigation (mL): 6000 mL  Total OUT: 6000 mL    Total NET: 4900 mL          MEDICATIONS  (STANDING):  heparin   Injectable 5000 Unit(s) SubCutaneous every 8 hours  hydrALAZINE 25 milliGRAM(s) Oral three times a day  influenza   Vaccine 0.5 milliLiter(s) IntraMuscular once  losartan 50 milliGRAM(s) Oral daily  metFORMIN 1000 milliGRAM(s) Oral two times a day  metoprolol tartrate 25 milliGRAM(s) Oral two times a day  senna 1 Tablet(s) Oral at bedtime  sodium chloride 0.9%. 1000 milliLiter(s) (75 mL/Hr) IV Continuous <Continuous>  tamsulosin 0.4 milliGRAM(s) Oral at bedtime    MEDICATIONS  (PRN):  acetaminophen   Tablet .. 650 milliGRAM(s) Oral every 6 hours PRN Temp greater or equal to 38.5C (101.3F), Mild Pain (1 - 3)  aluminum hydroxide/magnesium hydroxide/simethicone Suspension 30 milliLiter(s) Oral every 4 hours PRN Dyspepsia  HYDROmorphone  Injectable 0.5 milliGRAM(s) IV Push every 10 minutes PRN Severe Pain (7 - 10)  morphine  - Injectable 2 milliGRAM(s) IV Push every 4 hours PRN Severe Pain (7 - 10)  morphine  - Injectable 2 milliGRAM(s) IV Push every 10 minutes PRN Mild Pain (1 - 3)  oxycodone    5 mG/acetaminophen 325 mG 1 Tablet(s) Oral once PRN Moderate Pain (4 - 6)      LABS:                        13.4   13.26 )-----------( 157      ( 22 Sep 2021 08:03 )             40.0     09-22    137  |  103  |  22<H>  ----------------------------<  149<H>  4.2   |  20  |  1.4    Ca    8.2<L>      22 Sep 2021 08:03            RADIOLOGY & ADDITIONAL STUDIES: Reviewed

## 2021-09-23 LAB
ANION GAP SERPL CALC-SCNC: 9 MMOL/L — SIGNIFICANT CHANGE UP (ref 7–14)
BUN SERPL-MCNC: 23 MG/DL — HIGH (ref 10–20)
CALCIUM SERPL-MCNC: 7.8 MG/DL — LOW (ref 8.5–10.1)
CHLORIDE SERPL-SCNC: 106 MMOL/L — SIGNIFICANT CHANGE UP (ref 98–110)
CO2 SERPL-SCNC: 22 MMOL/L — SIGNIFICANT CHANGE UP (ref 17–32)
COVID-19 SPIKE DOMAIN AB INTERP: POSITIVE
COVID-19 SPIKE DOMAIN ANTIBODY RESULT: 93.7 U/ML — HIGH
CREAT SERPL-MCNC: 1.5 MG/DL — SIGNIFICANT CHANGE UP (ref 0.7–1.5)
GLUCOSE BLDC GLUCOMTR-MCNC: 130 MG/DL — HIGH (ref 70–99)
GLUCOSE BLDC GLUCOMTR-MCNC: 137 MG/DL — HIGH (ref 70–99)
GLUCOSE BLDC GLUCOMTR-MCNC: 139 MG/DL — HIGH (ref 70–99)
GLUCOSE BLDC GLUCOMTR-MCNC: 156 MG/DL — HIGH (ref 70–99)
GLUCOSE SERPL-MCNC: 140 MG/DL — HIGH (ref 70–99)
HCT VFR BLD CALC: 36.6 % — LOW (ref 42–52)
HGB BLD-MCNC: 12.1 G/DL — LOW (ref 14–18)
MCHC RBC-ENTMCNC: 30 PG — SIGNIFICANT CHANGE UP (ref 27–31)
MCHC RBC-ENTMCNC: 33.1 G/DL — SIGNIFICANT CHANGE UP (ref 32–37)
MCV RBC AUTO: 90.6 FL — SIGNIFICANT CHANGE UP (ref 80–94)
NRBC # BLD: 0 /100 WBCS — SIGNIFICANT CHANGE UP (ref 0–0)
PLATELET # BLD AUTO: 108 K/UL — LOW (ref 130–400)
POTASSIUM SERPL-MCNC: 4.7 MMOL/L — SIGNIFICANT CHANGE UP (ref 3.5–5)
POTASSIUM SERPL-SCNC: 4.7 MMOL/L — SIGNIFICANT CHANGE UP (ref 3.5–5)
RBC # BLD: 4.04 M/UL — LOW (ref 4.7–6.1)
RBC # FLD: 13.1 % — SIGNIFICANT CHANGE UP (ref 11.5–14.5)
SARS-COV-2 IGG+IGM SERPL QL IA: 93.7 U/ML — HIGH
SARS-COV-2 IGG+IGM SERPL QL IA: POSITIVE
SODIUM SERPL-SCNC: 137 MMOL/L — SIGNIFICANT CHANGE UP (ref 135–146)
WBC # BLD: 8.76 K/UL — SIGNIFICANT CHANGE UP (ref 4.8–10.8)
WBC # FLD AUTO: 8.76 K/UL — SIGNIFICANT CHANGE UP (ref 4.8–10.8)

## 2021-09-23 PROCEDURE — 76857 US EXAM PELVIC LIMITED: CPT | Mod: 26

## 2021-09-23 RX ORDER — CEPHALEXIN 500 MG
1 CAPSULE ORAL
Qty: 20 | Refills: 0
Start: 2021-09-23 | End: 2021-09-27

## 2021-09-23 RX ORDER — FAMOTIDINE 10 MG/ML
20 INJECTION INTRAVENOUS ONCE
Refills: 0 | Status: COMPLETED | OUTPATIENT
Start: 2021-09-23 | End: 2021-09-23

## 2021-09-23 RX ADMIN — SENNA PLUS 1 TABLET(S): 8.6 TABLET ORAL at 21:57

## 2021-09-23 RX ADMIN — SODIUM CHLORIDE 75 MILLILITER(S): 9 INJECTION INTRAMUSCULAR; INTRAVENOUS; SUBCUTANEOUS at 08:18

## 2021-09-23 RX ADMIN — TAMSULOSIN HYDROCHLORIDE 0.4 MILLIGRAM(S): 0.4 CAPSULE ORAL at 21:57

## 2021-09-23 RX ADMIN — Medication 25 MILLIGRAM(S): at 15:26

## 2021-09-23 RX ADMIN — Medication 25 MILLIGRAM(S): at 05:04

## 2021-09-23 RX ADMIN — FAMOTIDINE 20 MILLIGRAM(S): 10 INJECTION INTRAVENOUS at 17:55

## 2021-09-23 RX ADMIN — MORPHINE SULFATE 2 MILLIGRAM(S): 50 CAPSULE, EXTENDED RELEASE ORAL at 23:00

## 2021-09-23 RX ADMIN — ONDANSETRON 4 MILLIGRAM(S): 8 TABLET, FILM COATED ORAL at 13:26

## 2021-09-23 RX ADMIN — Medication 25 MILLIGRAM(S): at 17:54

## 2021-09-23 RX ADMIN — MORPHINE SULFATE 2 MILLIGRAM(S): 50 CAPSULE, EXTENDED RELEASE ORAL at 22:01

## 2021-09-23 RX ADMIN — LOSARTAN POTASSIUM 50 MILLIGRAM(S): 100 TABLET, FILM COATED ORAL at 05:04

## 2021-09-23 RX ADMIN — Medication 25 MILLIGRAM(S): at 21:57

## 2021-09-23 RX ADMIN — METFORMIN HYDROCHLORIDE 1000 MILLIGRAM(S): 850 TABLET ORAL at 08:18

## 2021-09-23 NOTE — PROGRESS NOTE ADULT - SUBJECTIVE AND OBJECTIVE BOX
S:  Pt doing well, only c/o occasional abdominal cramps.  Was having nausea for the past few days but feeling better, ate some breakfast.  Passing flatus  O: Vital Signs Last 24 Hrs  T(C): 36.8 (23 Sep 2021 05:01), Max: 36.8 (23 Sep 2021 05:01)  T(F): 98.2 (23 Sep 2021 05:01), Max: 98.2 (23 Sep 2021 05:01)  HR: 102 (23 Sep 2021 05:01) (93 - 102)  BP: 160/79 (23 Sep 2021 05:01) (121/77 - 165/81)  BP(mean): --  RR: 16 (23 Sep 2021 05:01) (16 - 18)  SpO2: --    MEDICATIONS  (STANDING):  hydrALAZINE 25 milliGRAM(s) Oral three times a day  influenza   Vaccine 0.5 milliLiter(s) IntraMuscular once  losartan 50 milliGRAM(s) Oral daily  metFORMIN 1000 milliGRAM(s) Oral two times a day  metoprolol tartrate 25 milliGRAM(s) Oral two times a day  senna 1 Tablet(s) Oral at bedtime  sodium chloride 0.9%. 1000 milliLiter(s) (75 mL/Hr) IV Continuous <Continuous>  tamsulosin 0.4 milliGRAM(s) Oral at bedtime    MEDICATIONS  (PRN):  acetaminophen   Tablet .. 650 milliGRAM(s) Oral every 6 hours PRN Temp greater or equal to 38.5C (101.3F), Mild Pain (1 - 3)  aluminum hydroxide/magnesium hydroxide/simethicone Suspension 30 milliLiter(s) Oral every 4 hours PRN Dyspepsia  HYDROmorphone  Injectable 0.5 milliGRAM(s) IV Push every 10 minutes PRN Severe Pain (7 - 10)  morphine  - Injectable 2 milliGRAM(s) IV Push every 4 hours PRN Severe Pain (7 - 10)  morphine  - Injectable 2 milliGRAM(s) IV Push every 10 minutes PRN Mild Pain (1 - 3)  ondansetron Injectable 4 milliGRAM(s) IV Push every 6 hours PRN Nausea and/or Vomiting    EXAM:  lungs:  cta  cvs: s1s2  abd: soft, NT/ND    Labs:        POCT Blood Glucose.: 156 mg/dL (23 Sep 2021 08:03)                          12.1   8.76  )-----------( 108      ( 23 Sep 2021 06:08 )             36.6         Hemoglobin: 12.1 g/dL (09-23 @ 06:08)  Hemoglobin: 13.4 g/dL (09-22 @ 08:03)  Hemoglobin: 14.5 g/dL (09-21 @ 15:30)      09-23    137  |  106  |  23<H>  ----------------------------<  140<H>  4.7   |  22  |  1.5      Calcium, Total Serum: 7.8 mg/dL (09-23-21 @ 06:08)         S:  Pt doing well, only c/o occasional abdominal cramps.  Was having nausea for the past few days but feeling better, ate some breakfast.  Passing flatus  O: Vital Signs Last 24 Hrs  T(C): 36.8 (23 Sep 2021 05:01), Max: 36.8 (23 Sep 2021 05:01)  T(F): 98.2 (23 Sep 2021 05:01), Max: 98.2 (23 Sep 2021 05:01)  HR: 102 (23 Sep 2021 05:01) (93 - 102)  BP: 160/79 (23 Sep 2021 05:01) (121/77 - 165/81)  BP(mean): --  RR: 16 (23 Sep 2021 05:01) (16 - 18)  SpO2: --    MEDICATIONS  (STANDING):  hydrALAZINE 25 milliGRAM(s) Oral three times a day  influenza   Vaccine 0.5 milliLiter(s) IntraMuscular once  losartan 50 milliGRAM(s) Oral daily  metFORMIN 1000 milliGRAM(s) Oral two times a day  metoprolol tartrate 25 milliGRAM(s) Oral two times a day  senna 1 Tablet(s) Oral at bedtime  sodium chloride 0.9%. 1000 milliLiter(s) (75 mL/Hr) IV Continuous <Continuous>  tamsulosin 0.4 milliGRAM(s) Oral at bedtime    MEDICATIONS  (PRN):  acetaminophen   Tablet .. 650 milliGRAM(s) Oral every 6 hours PRN Temp greater or equal to 38.5C (101.3F), Mild Pain (1 - 3)  aluminum hydroxide/magnesium hydroxide/simethicone Suspension 30 milliLiter(s) Oral every 4 hours PRN Dyspepsia  HYDROmorphone  Injectable 0.5 milliGRAM(s) IV Push every 10 minutes PRN Severe Pain (7 - 10)  morphine  - Injectable 2 milliGRAM(s) IV Push every 4 hours PRN Severe Pain (7 - 10)  morphine  - Injectable 2 milliGRAM(s) IV Push every 10 minutes PRN Mild Pain (1 - 3)  ondansetron Injectable 4 milliGRAM(s) IV Push every 6 hours PRN Nausea and/or Vomiting    EXAM:  lungs:  cta  cvs: s1s2  abd: soft, NT/ND  :  No phallic lesions, no scrotal tenderness, yoo in place with pink drainage    Labs:        POCT Blood Glucose.: 156 mg/dL (23 Sep 2021 08:03)                          12.1   8.76  )-----------( 108      ( 23 Sep 2021 06:08 )             36.6         Hemoglobin: 12.1 g/dL (09-23 @ 06:08)  Hemoglobin: 13.4 g/dL (09-22 @ 08:03)  Hemoglobin: 14.5 g/dL (09-21 @ 15:30)      09-23    137  |  106  |  23<H>  ----------------------------<  140<H>  4.7   |  22  |  1.5      Calcium, Total Serum: 7.8 mg/dL (09-23-21 @ 06:08)

## 2021-09-23 NOTE — PROGRESS NOTE ADULT - SUBJECTIVE AND OBJECTIVE BOX
Patient comfortable and tolerating diet.  Had BM    Afebrile VSS  Abdomen:  soft, non-tender  :  No CVA tenderness, no suprapubic fullness, yoo in place.  I stopped the irrigation and drainage is pink.    Extremities:  No raymundo's sign

## 2021-09-24 ENCOUNTER — TRANSCRIPTION ENCOUNTER (OUTPATIENT)
Age: 76
End: 2021-09-24

## 2021-09-24 VITALS — SYSTOLIC BLOOD PRESSURE: 174 MMHG | DIASTOLIC BLOOD PRESSURE: 89 MMHG | HEART RATE: 95 BPM | RESPIRATION RATE: 18 BRPM

## 2021-09-24 LAB
ANION GAP SERPL CALC-SCNC: 10 MMOL/L — SIGNIFICANT CHANGE UP (ref 7–14)
BUN SERPL-MCNC: 19 MG/DL — SIGNIFICANT CHANGE UP (ref 10–20)
CALCIUM SERPL-MCNC: 7.8 MG/DL — LOW (ref 8.5–10.1)
CHLORIDE SERPL-SCNC: 107 MMOL/L — SIGNIFICANT CHANGE UP (ref 98–110)
CO2 SERPL-SCNC: 21 MMOL/L — SIGNIFICANT CHANGE UP (ref 17–32)
CREAT SERPL-MCNC: 1.1 MG/DL — SIGNIFICANT CHANGE UP (ref 0.7–1.5)
GLUCOSE BLDC GLUCOMTR-MCNC: 112 MG/DL — HIGH (ref 70–99)
GLUCOSE BLDC GLUCOMTR-MCNC: 117 MG/DL — HIGH (ref 70–99)
GLUCOSE SERPL-MCNC: 128 MG/DL — HIGH (ref 70–99)
HCT VFR BLD CALC: 36 % — LOW (ref 42–52)
HGB BLD-MCNC: 11.8 G/DL — LOW (ref 14–18)
MCHC RBC-ENTMCNC: 29.6 PG — SIGNIFICANT CHANGE UP (ref 27–31)
MCHC RBC-ENTMCNC: 32.8 G/DL — SIGNIFICANT CHANGE UP (ref 32–37)
MCV RBC AUTO: 90.2 FL — SIGNIFICANT CHANGE UP (ref 80–94)
NIDUS STONE QN: SIGNIFICANT CHANGE UP
NRBC # BLD: 0 /100 WBCS — SIGNIFICANT CHANGE UP (ref 0–0)
PLATELET # BLD AUTO: 117 K/UL — LOW (ref 130–400)
POTASSIUM SERPL-MCNC: 4.4 MMOL/L — SIGNIFICANT CHANGE UP (ref 3.5–5)
POTASSIUM SERPL-SCNC: 4.4 MMOL/L — SIGNIFICANT CHANGE UP (ref 3.5–5)
RBC # BLD: 3.99 M/UL — LOW (ref 4.7–6.1)
RBC # FLD: 12.6 % — SIGNIFICANT CHANGE UP (ref 11.5–14.5)
SODIUM SERPL-SCNC: 138 MMOL/L — SIGNIFICANT CHANGE UP (ref 135–146)
WBC # BLD: 8.63 K/UL — SIGNIFICANT CHANGE UP (ref 4.8–10.8)
WBC # FLD AUTO: 8.63 K/UL — SIGNIFICANT CHANGE UP (ref 4.8–10.8)

## 2021-09-24 RX ORDER — ACETAMINOPHEN 500 MG
2 TABLET ORAL
Qty: 0 | Refills: 0 | DISCHARGE
Start: 2021-09-24

## 2021-09-24 RX ORDER — CEPHALEXIN 500 MG
500 CAPSULE ORAL ONCE
Refills: 0 | Status: COMPLETED | OUTPATIENT
Start: 2021-09-24 | End: 2021-09-24

## 2021-09-24 RX ORDER — SENNA PLUS 8.6 MG/1
1 TABLET ORAL
Qty: 0 | Refills: 0 | DISCHARGE
Start: 2021-09-24

## 2021-09-24 RX ADMIN — INFLUENZA VIRUS VACCINE 0.5 MILLILITER(S): 15; 15; 15; 15 SUSPENSION INTRAMUSCULAR at 11:30

## 2021-09-24 RX ADMIN — Medication 25 MILLIGRAM(S): at 06:15

## 2021-09-24 RX ADMIN — Medication 500 MILLIGRAM(S): at 11:18

## 2021-09-24 RX ADMIN — LOSARTAN POTASSIUM 50 MILLIGRAM(S): 100 TABLET, FILM COATED ORAL at 06:15

## 2021-09-24 NOTE — PROGRESS NOTE ADULT - SUBJECTIVE AND OBJECTIVE BOX
S; Pt feeling better, denies nausea. Didn't want to eat breakfast so he didn't take his metformin. Still getting some pain radiating to penis while passing gas but improving.  Was having some bloody leakage around yoo insertion site but resolving.   Urine is dark yellow in yoo tubing - no hematuria noted  O: Vital Signs Last 24 Hrs  T(C): 36.8 (24 Sep 2021 05:00), Max: 37.7 (23 Sep 2021 13:15)  T(F): 98.3 (24 Sep 2021 05:00), Max: 99.9 (23 Sep 2021 22:52)  HR: 114 (24 Sep 2021 05:00) (107 - 118)  BP: 175/82 (24 Sep 2021 05:00) (169/88 - 175/82)  BP(mean): --  RR: 16 (24 Sep 2021 05:00) (16 - 16)  SpO2: --    MEDICATIONS  (STANDING):  cephalexin 500 milliGRAM(s) Oral once  hydrALAZINE 25 milliGRAM(s) Oral three times a day  influenza   Vaccine 0.5 milliLiter(s) IntraMuscular once  losartan 50 milliGRAM(s) Oral daily  metFORMIN 1000 milliGRAM(s) Oral two times a day  metoprolol tartrate 25 milliGRAM(s) Oral two times a day  senna 1 Tablet(s) Oral at bedtime  tamsulosin 0.4 milliGRAM(s) Oral at bedtime    MEDICATIONS  (PRN):  acetaminophen   Tablet .. 650 milliGRAM(s) Oral every 6 hours PRN Temp greater or equal to 38.5C (101.3F), Mild Pain (1 - 3)  aluminum hydroxide/magnesium hydroxide/simethicone Suspension 30 milliLiter(s) Oral every 4 hours PRN Dyspepsia  HYDROmorphone  Injectable 0.5 milliGRAM(s) IV Push every 10 minutes PRN Severe Pain (7 - 10)  morphine  - Injectable 2 milliGRAM(s) IV Push every 4 hours PRN Severe Pain (7 - 10)  morphine  - Injectable 2 milliGRAM(s) IV Push every 10 minutes PRN Mild Pain (1 - 3)  ondansetron Injectable 4 milliGRAM(s) IV Push every 6 hours PRN Nausea and/or Vomiting    EXAM:  lungs: cta  cvs: s1s2  abd: soft, NT/ND.  Yoo in place, dried blood noted at meatus  ext; no LE edema nnoted    Labs:  CAPILLARY BLOOD GLUCOSE      POCT Blood Glucose.: 117 mg/dL (24 Sep 2021 07:56)  POCT Blood Glucose.: 130 mg/dL (23 Sep 2021 21:42)  POCT Blood Glucose.: 137 mg/dL (23 Sep 2021 16:28)  POCT Blood Glucose.: 139 mg/dL (23 Sep 2021 11:42)                          11.8   8.63  )-----------( 117      ( 24 Sep 2021 07:49 )             36.0         09-24    138  |  107  |  19  ----------------------------<  128<H>  4.4   |  21  |  1.1      Calcium, Total Serum: 7.8 mg/dL (09-24-21 @ 07:49)           S; Pt feeling better, denies nausea. Didn't want to eat breakfast so he didn't take his metformin. Still getting some pain radiating to penis while passing gas but improving.  Was having some bloody leakage around yoo insertion site but resolving.   Urine is dark yellow in yoo tubing - no hematuria noted  O: Vital Signs Last 24 Hrs  T(C): 36.8 (24 Sep 2021 05:00), Max: 37.7 (23 Sep 2021 13:15)  T(F): 98.3 (24 Sep 2021 05:00), Max: 99.9 (23 Sep 2021 22:52)  HR: 114 (24 Sep 2021 05:00) (107 - 118)  BP: 175/82 (24 Sep 2021 05:00) (169/88 - 175/82)  BP(mean): --  RR: 16 (24 Sep 2021 05:00) (16 - 16)  SpO2: --    MEDICATIONS  (STANDING):  cephalexin 500 milliGRAM(s) Oral once  hydrALAZINE 25 milliGRAM(s) Oral three times a day  influenza   Vaccine 0.5 milliLiter(s) IntraMuscular once  losartan 50 milliGRAM(s) Oral daily  metFORMIN 1000 milliGRAM(s) Oral two times a day  metoprolol tartrate 25 milliGRAM(s) Oral two times a day  senna 1 Tablet(s) Oral at bedtime  tamsulosin 0.4 milliGRAM(s) Oral at bedtime    MEDICATIONS  (PRN):  acetaminophen   Tablet .. 650 milliGRAM(s) Oral every 6 hours PRN Temp greater or equal to 38.5C (101.3F), Mild Pain (1 - 3)  aluminum hydroxide/magnesium hydroxide/simethicone Suspension 30 milliLiter(s) Oral every 4 hours PRN Dyspepsia  HYDROmorphone  Injectable 0.5 milliGRAM(s) IV Push every 10 minutes PRN Severe Pain (7 - 10)  morphine  - Injectable 2 milliGRAM(s) IV Push every 4 hours PRN Severe Pain (7 - 10)  morphine  - Injectable 2 milliGRAM(s) IV Push every 10 minutes PRN Mild Pain (1 - 3)  ondansetron Injectable 4 milliGRAM(s) IV Push every 6 hours PRN Nausea and/or Vomiting    EXAM:  lungs: cta  cvs: s1s2  abd: soft, NT/ND.  Yoo in place, dried blood noted at meatus.  drainage is yellow/pink.  No clots seen in catheter  ext; no LE edema nnoted    Labs:  CAPILLARY BLOOD GLUCOSE      POCT Blood Glucose.: 117 mg/dL (24 Sep 2021 07:56)  POCT Blood Glucose.: 130 mg/dL (23 Sep 2021 21:42)  POCT Blood Glucose.: 137 mg/dL (23 Sep 2021 16:28)  POCT Blood Glucose.: 139 mg/dL (23 Sep 2021 11:42)                          11.8   8.63  )-----------( 117      ( 24 Sep 2021 07:49 )             36.0         09-24    138  |  107  |  19  ----------------------------<  128<H>  4.4   |  21  |  1.1      Calcium, Total Serum: 7.8 mg/dL (09-24-21 @ 07:49)

## 2021-09-24 NOTE — DISCHARGE NOTE NURSING/CASE MANAGEMENT/SOCIAL WORK - NSDCPEFALRISK_GEN_ALL_CORE
For information on Fall & injury Prevention, visit [email or web address]

## 2021-09-24 NOTE — DISCHARGE NOTE PROVIDER - NSDCFUADDINST_GEN_ALL_CORE_FT
If you experience increased abdominal pain, increased blood in urine or fevers (100.5 or greater)/chills - please call Dr. Allen or return to ER    Drink plenty of fluids and avoid constipation    If yoo stops draining and you experience increased abdominal pain - return to ER. The yoo catheter may be clogged and have to be changed    Take antibiotics as directed. May take tylenol for pain    Please return to office on Monday 9/27 at 10 am for yoo catheter removal and further recommendations

## 2021-09-24 NOTE — DISCHARGE NOTE NURSING/CASE MANAGEMENT/SOCIAL WORK - PATIENT PORTAL LINK FT
You can access the FollowMyHealth Patient Portal offered by [facility] by registering at the following website: [email or web address]. By joining [employer]’s FollowMyHealth portal, you will also be able to view your health information using other applications (apps) compatible with our system.

## 2021-09-24 NOTE — DISCHARGE NOTE PROVIDER - NSDCMRMEDTOKEN_GEN_ALL_CORE_FT
acetaminophen 325 mg oral tablet: 2 tab(s) orally every 6 hours, As needed, Temp greater or equal to 38.5C (101.3F), Mild Pain (1 - 3)  cephalexin 500 mg oral tablet: 1 tab(s) orally every 6 hours   hydrALAZINE 25 mg oral tablet: 1 tab(s) orally 3 times a day; monitor your blood pressure at home   irbesartan 150 mg oral tablet: 1 tab(s) orally once a day  metFORMIN 1000 mg oral tablet: 1 tab(s) orally 2 times a day  Metoprolol Tartrate 25 mg oral tablet: 1 tab(s) orally 2 times a day  senna oral tablet: 1 tab(s) orally once a day (at bedtime)  tamsulosin 0.4 mg oral capsule: 1 cap(s) orally once a day (at bedtime)

## 2021-09-24 NOTE — PROGRESS NOTE ADULT - ASSESSMENT
BPH s/p TURP POD #1  - Hold CBI, cap port  - monitor UO  - monitor symptoms  - FU am labs  - if remains stable, dc this afternoon and FU in AM with attending for Cath removal.
status post TURP    1.  Will monitor drainage, if drainage remains clear, may discharge home with leg bag.  
POD#3 s/p TURP    Case D/W Dr. Allen:    - will D/C home with yoo to leg bag and F/U in office on Monday 9/27 for TOV   - will give keflex x 1 dose this AM then continue x 5 days  
POD # 2 s/p TURP    - hold CBI  this AM  - continue to monitor for hematuria   - F/u with Dr. Allen

## 2021-09-24 NOTE — DISCHARGE NOTE NURSING/CASE MANAGEMENT/SOCIAL WORK - NSDCVIVACCINE_GEN_ALL_CORE_FT
influenza, injectable, quadrivalent, preservative free; 25-Feb-2020 12:22; Apple Tai (RN); [employer]; [id number] (Exp. Date: 30-Jun-2020); IntraMuscular; Deltoid Left.; 0.5 milliLiter(s); VIS (VIS Published: 15-Aug-2019, VIS Presented: 25-Feb-2020);   influenza, injectable, quadrivalent, preservative free; 24-Sep-2021 11:30; Elvira Le (RN); [employer]; [id number] (Exp. Date: 30-Jun-2022); IntraMuscular; Deltoid Right.; 0.5 milliLiter(s); VIS (VIS Published: 15-Aug-2019, VIS Presented: 24-Sep-2021);

## 2021-09-24 NOTE — DISCHARGE NOTE PROVIDER - HOSPITAL COURSE
s/p turp 9/21. Pt admitted for post op CBI - hematuria persisted x 2 days which resolved upon discontinuation of CBI.  He experienced post op nausea/abd discomfort which also resolved with addition of pepcid.  He remained afebrile, all labs WNL.  He received 2 doses of ancef post op.  He was discharged on 9/24 with keflex x 5 days, fley to leg bag abd F/U office 9/27 for TOV s/p turp 9/21. Pt admitted for post op CBI - hematuria persisted x 2 days which resolved upon discontinuation of CBI.  He experienced post op nausea/abd discomfort which also resolved with addition of pepcid.  He remained afebrile, all labs WNL.  He received 2 doses of ancef post op.  He was discharged on 9/24 with keflex x 5 days, fley to leg bag abd F/U office 9/27 for TOV.      Patient's anemia was 2/2 acute post-procedure blood loss treated with CBI and yoo traction.  Daily CBC's were performed and patient will FU outpatient.

## 2021-09-24 NOTE — DISCHARGE NOTE PROVIDER - CARE PROVIDER_API CALL
Darinel Allen)  Urology  [address] [place] [address]  Phone: [phone]  Fax: [phone]  Scheduled Appointment: 09/27/2021 10:00 AM

## 2022-05-30 NOTE — PHYSICAL THERAPY INITIAL EVALUATION ADULT - LIVES WITH, PROFILE
Carlos Manuel Faye(Attending) spouse/1 flight of stairs (12-13 steps) in home 1 flight of stairs (12-13 steps) in home

## 2023-01-23 NOTE — ED PROVIDER NOTE - NS ED ATTENDING STATEMENT MOD
Attending with Topical Retinoid counseling:  Patient advised to apply a pea-sized amount only at bedtime and wait 30 minutes after washing their face before applying.  If too drying, patient may add a non-comedogenic moisturizer. The patient verbalized understanding of the proper use and possible adverse effects of retinoids.  All of the patient's questions and concerns were addressed.

## 2023-03-03 NOTE — ASU PATIENT PROFILE, ADULT - INTERNATIONAL TRAVEL
Skilled reason for visit: education regarding AFIB          Caregiver involvement:  and family help with all needs . Medications reviewed and all medications are available in the home this visit. The following education was provided regarding medications:  eliquis and what it is for and bleeding precautions . MD notified of any discrepancies/look a-like medications/medication interactions: none    Medications are reconciled  at this time.            Home health supplies by type and quantity ordered/delivered this visit include: none          Patient education provided this visit: SN educated on AFIB and s/s of AFIB as well as bleeding precautions          Sharps education provided: NA         Patient level of understanding of education provided: patient verbalized understanding          Skilled Care Performed this visit: education          Patient response to procedure performed:  NA         Agency Progress toward goals: progressing          Patient's Progress towards personal goals: patient stated that she is doing ok d         Home exercise program: deep breathing          Continued need for the following skills: Nursing and Physical Therapy         Plan for next visit: education         Patient and/or caregiver notified and agrees to changes in the Plan of Care YES/NO/NA: N/A           The following discharge planning was discussed with the pt/caregiver: when goals met and education is complete
No

## 2023-09-24 NOTE — H&P PST ADULT - VENOUS THROMBOEMBOLISM
Internal Medicine Progress Note      Subjective:      Patient seen and examined at the bedside denies any other complaint.    Review of Systems  Negative for all 14 systems except as per subjective    I/O's    Intake/Output Summary (Last 24 hours) at 9/24/2023 1448  Last data filed at 9/23/2023 1700  Gross per 24 hour   Intake --   Output 400 ml   Net -400 ml       ALLERGIES:  Diazepam, Codeine, Hydrocodone-acetaminophen, and Hydromorphone     Hospital Meds  Current Facility-Administered Medications   Medication Dose Route Frequency Provider Last Rate Last Admin   • ketorolac (TORADOL) injection 15 mg  15 mg Intravenous Q6H PRN Pat Welch MD   15 mg at 09/24/23 1133   • metoPROLOL tartrate (LOPRESSOR) tablet 25 mg  25 mg Oral 2 times per day Brian Nation MD   25 mg at 09/24/23 0906   • traMADol (ULTRAM) tablet 50 mg  50 mg Oral Q12H PRN Tc Abreu, DO   50 mg at 09/22/23 2047   • [Held by provider] heparin (porcine) injection 5,000 Units  5,000 Units Subcutaneous 3 times per day Ayaz Luciano MD   5,000 Units at 09/23/23 0630   • acetaminophen (TYLENOL) tablet 650 mg  650 mg Oral Q6H Ayaz Luciano MD   650 mg at 09/24/23 0906   • ibuprofen (MOTRIN) tablet 600 mg  600 mg Oral Q8H PRN Tc Abreu, DO   600 mg at 09/21/23 1209   • hydroCHLOROthiazide (HYDRODIURIL) tablet 25 mg  25 mg Oral Daily Tc Abreu, DO   25 mg at 09/24/23 0905   • pantoprazole (PROTONIX) EC tablet 40 mg  40 mg Oral 2 times per day Tc Abreu, DO   40 mg at 09/24/23 0906   • sodium chloride 0.9% infusion   Intravenous Continuous PRN Alexis Smallwood       • sodium chloride 0.9% infusion   Intravenous Continuous PRN Xoubfranny Spike, DPM       • sucralfate (CARAFATE) 1 GM/10ML suspension 1 g  1 g Oral 4x Daily AC & HS Xoubi, Spike, DPM   1 g at 09/24/23 1133   • haloperidol lactate (HALDOL) 5 MG/ML short-acting injection 0.5 mg  0.5 mg Intravenous BID PRN Tc Abreu DO   0.5 mg at 09/17/23 0110         Last Recorded Vitals:  Vitals with min/max:  Vital Last Value 24 Hour Range   Temperature 98.1 °F (36.7 °C) (09/24/23 0906) Temp  Min: 98.1 °F (36.7 °C)  Max: 99.1 °F (37.3 °C)   Pulse 90 (09/24/23 0906) Pulse  Min: 84  Max: 97   Respiratory 16 (09/24/23 0906) Resp  Min: 16  Max: 16   Non-Invasive  Blood Pressure 107/88 (09/24/23 0906) BP  Min: 107/88  Max: 126/61   Pulse Oximetry 98 % (09/24/23 0906) SpO2  Min: 98 %  Max: 99 %           Physical Exam:  Physical Exam     Heart :-               Regular rate and rhythm no S3-S4 murmur  Lungs :-              Clear to auscultation ×2 no rales rhonchi wheezes  Abdomen :-        Soft nontender bowel sounds present ×4 no rebound rigidity or   guarding  Extremity :-         No cyanosis clubbing edema distal pulses present +2/4×4  Neurologically:-  Patient is alert and oriented ×4 no focal neurological deficit has been observed.    Lab Results:  CBC  Recent Labs   Lab 09/24/23  0448 09/23/23  0450 09/22/23  0508   WBC 11.8* 12.7* 10.7   RBC 3.40* 3.39* 3.64*   HGB 9.4* 9.3* 10.0*   HCT 28.9* 29.4* 30.5*   MCV 85.0 86.7 83.8   MCH 27.6 27.4 27.5   MCHC 32.5 31.6* 32.8   RDW-CV 19.9* 19.2* 19.4*    413 342   Lymphocytes, Percent 13 16 14     CMP  Recent Labs   Lab 09/24/23  0448 09/23/23  0450 09/22/23  0508 09/21/23  0450   Sodium 139 140 141 140   Chloride 110 112* 112* 112*   BUN 37* 33* 32* 30*   Potassium 3.7 3.5 3.5 3.4   Glucose 69* 79 90 208*   Creatinine 1.33* 1.28* 1.14* 1.16*   Calcium 7.8* 7.6* 7.6* 7.9*     No results for input(s): \"RAPDTR\", \"NTPROB\" in the last 72 hours.  Recent Labs   Lab 09/24/23  0448   ALKPT 58   BILIRUBIN 0.3   ALBUMIN 1.5*   GPT 22   AST 60*        Imaging      EGD   Final Result      XR FOOT 3 OR MORE VIEWS LEFT   Final Result   FINDINGS/IMPRESSION:   1.   Status post transmetatarsal amputation of the first through fifth toes   2.   No immediate postoperative complication   3.   Amputation margins are sharp   4.   Soft tissue  and/or dressing covers the amputation stent   5.   Generalized osseous demineralization   6.   Vascular calcification present         Electronically Signed by: TERRY TORRES MD    Signed on: 9/16/2023 9:03 AM    Workstation ID: [id number]      MRI BRAIN WO CONTRAST    (Results Pending)       Cultures  Microbiology Results     None           Assessment/Plan:      Severe peripheral arterial disease  Patient has undergone multiple intervention  Patient is recommended for the surgical intervention  S/p surgical intervention  We will continue supportive care      Hypertension  Blood pressure will be monitored closely      Dyslipidemia  Patient is stable.      Patient was seen in presence of patient is resting comfortably today's events noted.  We will continue supportive care we will have cardiology evaluation.  For SVT        Patient has severe protein calorie malnutrition  Present on admission  We will follow dietitian's recommendation.      Sepsis  Secondary to the gangrenous foot  Present on admission.        Patient kidney function is little bit worsening  We will continue the IV hydration    Patient   mental status changes may be secondary due to the metabolic encephalopathy  The patient will be considered for the frequent reorientation  We will consider for the physical therapy  We will start discharge planning upon consult clearance.        Left Coccyx stage 3 pressure injury, Right upper buttock stage 3 pressure injury, Right lower buttock stage 3 pressure injury and Coccyx stage 2 pressure injury -all present on admission      Patient is having some sinus tachycardia  Will discuss with the cardiology  We will continue supportive care.    FEN  -Diet  -IVF  -Electrolyte protocol      Best Practices    DVT Prophylaxis  :- SCD and SQ Heparin  Deconditioning  :- Physical therapy and Occupational therapy  Diet :- per tolerance  GI Prophylaxis :- Pepcid 20 mg bid                              Colace 100 mg  bid      Code Status    Code Status: Full Resuscitation    Tc Abreu,   9/24/2023 2:48 PM         no
